# Patient Record
Sex: MALE | Race: WHITE | NOT HISPANIC OR LATINO | Employment: FULL TIME | URBAN - METROPOLITAN AREA
[De-identification: names, ages, dates, MRNs, and addresses within clinical notes are randomized per-mention and may not be internally consistent; named-entity substitution may affect disease eponyms.]

---

## 2018-08-01 ENCOUNTER — HISTORICAL (OUTPATIENT)
Dept: ADMINISTRATIVE | Facility: HOSPITAL | Age: 33
End: 2018-08-01

## 2018-08-01 LAB
ALBUMIN SERPL-MCNC: 4 GM/DL (ref 3.4–5)
ALBUMIN/GLOB SERPL: 1.2 {RATIO}
ALP SERPL-CCNC: 80 UNIT/L (ref 50–136)
ALT SERPL-CCNC: 42 UNIT/L (ref 12–78)
AST SERPL-CCNC: 25 UNIT/L (ref 15–37)
BILIRUB SERPL-MCNC: 0.7 MG/DL (ref 0.2–1)
BILIRUBIN DIRECT+TOT PNL SERPL-MCNC: 0.2 MG/DL (ref 0–0.2)
BILIRUBIN DIRECT+TOT PNL SERPL-MCNC: 0.5 MG/DL (ref 0–0.8)
BUN SERPL-MCNC: 14 MG/DL (ref 7–18)
CALCIUM SERPL-MCNC: 8.9 MG/DL (ref 8.5–10.1)
CHLORIDE SERPL-SCNC: 111 MMOL/L (ref 98–107)
CHOLEST SERPL-MCNC: 179 MG/DL (ref 0–200)
CHOLEST/HDLC SERPL: 3.6 {RATIO} (ref 0–5)
CO2 SERPL-SCNC: 25 MMOL/L (ref 21–32)
CREAT SERPL-MCNC: 0.98 MG/DL (ref 0.7–1.3)
ERYTHROCYTE [DISTWIDTH] IN BLOOD BY AUTOMATED COUNT: 12.1 % (ref 11.5–17)
GLOBULIN SER-MCNC: 3.4 GM/DL (ref 2.4–3.5)
GLUCOSE SERPL-MCNC: 94 MG/DL (ref 74–106)
HCT VFR BLD AUTO: 46.7 % (ref 42–52)
HDLC SERPL-MCNC: 50 MG/DL (ref 35–60)
HGB BLD-MCNC: 16 GM/DL (ref 14–18)
LDLC SERPL CALC-MCNC: 113 MG/DL (ref 0–129)
MCH RBC QN AUTO: 30.6 PG (ref 27–31)
MCHC RBC AUTO-ENTMCNC: 34.3 GM/DL (ref 33–36)
MCV RBC AUTO: 89.3 FL (ref 80–94)
PLATELET # BLD AUTO: 236 X10(3)/MCL (ref 130–400)
PMV BLD AUTO: 9.2 FL (ref 9.4–12.4)
POTASSIUM SERPL-SCNC: 4.2 MMOL/L (ref 3.5–5.1)
PROT SERPL-MCNC: 7.4 GM/DL (ref 6.4–8.2)
RBC # BLD AUTO: 5.23 X10(6)/MCL (ref 4.7–6.1)
SODIUM SERPL-SCNC: 143 MMOL/L (ref 136–145)
TESTOST SERPL-MCNC: 487 NG/DL (ref 241–827)
TRIGL SERPL-MCNC: 78 MG/DL (ref 30–150)
TSH SERPL-ACNC: 1.01 MIU/L (ref 0.36–3.74)
VLDLC SERPL CALC-MCNC: 16 MG/DL
WBC # SPEC AUTO: 5.6 X10(3)/MCL (ref 4.5–11.5)

## 2019-09-25 ENCOUNTER — HISTORICAL (OUTPATIENT)
Dept: ADMINISTRATIVE | Facility: HOSPITAL | Age: 34
End: 2019-09-25

## 2019-09-25 LAB
ALBUMIN SERPL-MCNC: 4 GM/DL (ref 3.4–5)
ALBUMIN/GLOB SERPL: 1.3 RATIO (ref 1.1–2)
ALP SERPL-CCNC: 88 UNIT/L (ref 50–136)
ALT SERPL-CCNC: 39 UNIT/L (ref 12–78)
AST SERPL-CCNC: 22 UNIT/L (ref 15–37)
BILIRUB SERPL-MCNC: 0.8 MG/DL (ref 0.2–1)
BILIRUBIN DIRECT+TOT PNL SERPL-MCNC: 0.2 MG/DL (ref 0–0.5)
BILIRUBIN DIRECT+TOT PNL SERPL-MCNC: 0.6 MG/DL (ref 0–0.8)
BUN SERPL-MCNC: 15 MG/DL (ref 7–18)
CALCIUM SERPL-MCNC: 8.9 MG/DL (ref 8.5–10.1)
CHLORIDE SERPL-SCNC: 108 MMOL/L (ref 98–107)
CO2 SERPL-SCNC: 26 MMOL/L (ref 21–32)
CREAT SERPL-MCNC: 0.94 MG/DL (ref 0.7–1.3)
ERYTHROCYTE [DISTWIDTH] IN BLOOD BY AUTOMATED COUNT: 11.8 % (ref 11.5–17)
GLOBULIN SER-MCNC: 3.1 GM/DL (ref 2.4–3.5)
GLUCOSE SERPL-MCNC: 91 MG/DL (ref 74–106)
HCT VFR BLD AUTO: 45.7 % (ref 42–52)
HGB BLD-MCNC: 16 GM/DL (ref 14–18)
MCH RBC QN AUTO: 30.8 PG (ref 27–31)
MCHC RBC AUTO-ENTMCNC: 35 GM/DL (ref 33–36)
MCV RBC AUTO: 87.9 FL (ref 80–94)
PLATELET # BLD AUTO: 271 X10(3)/MCL (ref 130–400)
PMV BLD AUTO: 9.2 FL (ref 9.4–12.4)
POTASSIUM SERPL-SCNC: 4.1 MMOL/L (ref 3.5–5.1)
PROT SERPL-MCNC: 7.1 GM/DL (ref 6.4–8.2)
RBC # BLD AUTO: 5.2 X10(6)/MCL (ref 4.7–6.1)
SODIUM SERPL-SCNC: 141 MMOL/L (ref 136–145)
TESTOST SERPL-MCNC: 267 NG/DL (ref 241–827)
WBC # SPEC AUTO: 5.5 X10(3)/MCL (ref 4.5–11.5)

## 2020-03-05 ENCOUNTER — HOSPITAL ENCOUNTER (OUTPATIENT)
Dept: NUTRITION | Facility: HOSPITAL | Age: 35
End: 2020-03-06
Attending: SURGERY | Admitting: SURGERY

## 2020-03-05 LAB
ABS NEUT (OLG): 8.55 X10(3)/MCL (ref 2.1–9.2)
ALBUMIN SERPL-MCNC: 4.4 GM/DL (ref 3.4–5)
ALBUMIN/GLOB SERPL: 1.3 RATIO (ref 1.1–2)
ALP SERPL-CCNC: 83 UNIT/L (ref 50–136)
ALT SERPL-CCNC: 41 UNIT/L (ref 12–78)
APPEARANCE, UA: CLEAR
APTT PPP: 30.3 SECOND(S) (ref 23.2–33.7)
AST SERPL-CCNC: 29 UNIT/L (ref 15–37)
BACTERIA SPEC CULT: ABNORMAL /HPF
BASOPHILS # BLD AUTO: 0 X10(3)/MCL (ref 0–0.2)
BASOPHILS NFR BLD AUTO: 0 %
BILIRUB SERPL-MCNC: 0.9 MG/DL (ref 0.2–1)
BILIRUB UR QL STRIP: NEGATIVE
BILIRUBIN DIRECT+TOT PNL SERPL-MCNC: 0.2 MG/DL (ref 0–0.5)
BILIRUBIN DIRECT+TOT PNL SERPL-MCNC: 0.7 MG/DL (ref 0–0.8)
BUN SERPL-MCNC: 12 MG/DL (ref 7–18)
CALCIUM SERPL-MCNC: 9.8 MG/DL (ref 8.5–10.1)
CHLORIDE SERPL-SCNC: 106 MMOL/L (ref 98–107)
CO2 SERPL-SCNC: 23 MMOL/L (ref 21–32)
COLOR UR: YELLOW
CREAT SERPL-MCNC: 0.99 MG/DL (ref 0.7–1.3)
ERYTHROCYTE [DISTWIDTH] IN BLOOD BY AUTOMATED COUNT: 12.2 % (ref 11.5–17)
GLOBULIN SER-MCNC: 3.5 GM/DL (ref 2.4–3.5)
GLUCOSE (UA): NEGATIVE
GLUCOSE SERPL-MCNC: 148 MG/DL (ref 74–106)
HCT VFR BLD AUTO: 47.8 % (ref 42–52)
HGB BLD-MCNC: 16.4 GM/DL (ref 14–18)
HGB UR QL STRIP: NEGATIVE
INR PPP: 1 (ref 0–1.3)
KETONES UR QL STRIP: ABNORMAL
LEUKOCYTE ESTERASE UR QL STRIP: ABNORMAL
LIPASE SERPL-CCNC: 84 UNIT/L (ref 73–393)
LYMPHOCYTES # BLD AUTO: 0.5 X10(3)/MCL (ref 0.6–4.6)
LYMPHOCYTES NFR BLD AUTO: 5 %
MCH RBC QN AUTO: 30.4 PG (ref 27–31)
MCHC RBC AUTO-ENTMCNC: 34.3 GM/DL (ref 33–36)
MCV RBC AUTO: 88.7 FL (ref 80–94)
MONOCYTES # BLD AUTO: 0 X10(3)/MCL (ref 0.1–1.3)
MONOCYTES NFR BLD AUTO: 0 %
NEUTROPHILS # BLD AUTO: 8.55 X10(3)/MCL (ref 2.1–9.2)
NEUTROPHILS NFR BLD AUTO: 94 %
NITRITE UR QL STRIP: NEGATIVE
PH UR STRIP: 8 [PH] (ref 5–9)
PLATELET # BLD AUTO: 299 X10(3)/MCL (ref 130–400)
PMV BLD AUTO: 9.5 FL (ref 9.4–12.4)
POTASSIUM SERPL-SCNC: 4.3 MMOL/L (ref 3.5–5.1)
PROT SERPL-MCNC: 7.9 GM/DL (ref 6.4–8.2)
PROT UR QL STRIP: ABNORMAL
PROTHROMBIN TIME: 13.3 SECOND(S) (ref 11.1–13.7)
RBC # BLD AUTO: 5.39 X10(6)/MCL (ref 4.7–6.1)
RBC #/AREA URNS HPF: ABNORMAL /[HPF]
SODIUM SERPL-SCNC: 136 MMOL/L (ref 136–145)
SP GR UR STRIP: 1.03 (ref 1–1.03)
SQUAMOUS EPITHELIAL, UA: ABNORMAL
TROPONIN I SERPL-MCNC: <0.02 NG/ML (ref 0.02–0.49)
UROBILINOGEN UR STRIP-ACNC: 1
WBC # SPEC AUTO: 9.1 X10(3)/MCL (ref 4.5–11.5)
WBC #/AREA URNS HPF: ABNORMAL /HPF

## 2020-09-10 ENCOUNTER — HISTORICAL (OUTPATIENT)
Dept: ADMINISTRATIVE | Facility: HOSPITAL | Age: 35
End: 2020-09-10

## 2020-10-02 ENCOUNTER — HISTORICAL (OUTPATIENT)
Dept: RADIOLOGY | Facility: HOSPITAL | Age: 35
End: 2020-10-02

## 2020-12-02 LAB
ABS NEUT (OLG): 5.41 X10(3)/MCL (ref 2.1–9.2)
ALBUMIN SERPL-MCNC: 4.5 GM/DL (ref 3.5–5)
ALBUMIN/GLOB SERPL: 1.2 RATIO (ref 1.1–2)
ALP SERPL-CCNC: 97 UNIT/L (ref 40–150)
ALT SERPL-CCNC: 29 UNIT/L (ref 0–55)
AST SERPL-CCNC: 25 UNIT/L (ref 5–34)
BASOPHILS # BLD AUTO: 0.1 X10(3)/MCL (ref 0–0.2)
BASOPHILS NFR BLD AUTO: 1 %
BILIRUB SERPL-MCNC: 0.6 MG/DL
BILIRUBIN DIRECT+TOT PNL SERPL-MCNC: 0.2 MG/DL (ref 0–0.5)
BILIRUBIN DIRECT+TOT PNL SERPL-MCNC: 0.4 MG/DL (ref 0–0.8)
BUN SERPL-MCNC: 13.5 MG/DL (ref 8.9–20.6)
CALCIUM SERPL-MCNC: 9.6 MG/DL (ref 8.4–10.2)
CHLORIDE SERPL-SCNC: 103 MMOL/L (ref 98–107)
CO2 SERPL-SCNC: 29 MMOL/L (ref 22–29)
CREAT SERPL-MCNC: 1.05 MG/DL (ref 0.73–1.18)
EOSINOPHIL # BLD AUTO: 0.2 X10(3)/MCL (ref 0–0.9)
EOSINOPHIL NFR BLD AUTO: 3 %
ERYTHROCYTE [DISTWIDTH] IN BLOOD BY AUTOMATED COUNT: 12.9 % (ref 11.5–17)
GLOBULIN SER-MCNC: 3.6 GM/DL (ref 2.4–3.5)
GLUCOSE SERPL-MCNC: 103 MG/DL (ref 74–100)
HCT VFR BLD AUTO: 48.6 % (ref 42–52)
HGB BLD-MCNC: 16.3 GM/DL (ref 14–18)
LYMPHOCYTES # BLD AUTO: 2.3 X10(3)/MCL (ref 0.6–4.6)
LYMPHOCYTES NFR BLD AUTO: 27 %
MCH RBC QN AUTO: 31 PG (ref 27–31)
MCHC RBC AUTO-ENTMCNC: 33.5 GM/DL (ref 33–36)
MCV RBC AUTO: 92.4 FL (ref 80–94)
MONOCYTES # BLD AUTO: 0.5 X10(3)/MCL (ref 0.1–1.3)
MONOCYTES NFR BLD AUTO: 6 %
NEUTROPHILS # BLD AUTO: 5.41 X10(3)/MCL (ref 2.1–9.2)
NEUTROPHILS NFR BLD AUTO: 64 %
PLATELET # BLD AUTO: 347 X10(3)/MCL (ref 130–400)
PMV BLD AUTO: 9.5 FL (ref 9.4–12.4)
POTASSIUM SERPL-SCNC: 4.4 MMOL/L (ref 3.5–5.1)
PROT SERPL-MCNC: 8.1 GM/DL (ref 6.4–8.3)
RBC # BLD AUTO: 5.26 X10(6)/MCL (ref 4.7–6.1)
SARS-COV-2 RNA RESP QL NAA+PROBE: NOT DETECTED
SODIUM SERPL-SCNC: 143 MMOL/L (ref 136–145)
WBC # SPEC AUTO: 8.5 X10(3)/MCL (ref 4.5–11.5)

## 2020-12-03 ENCOUNTER — HISTORICAL (OUTPATIENT)
Dept: SURGERY | Facility: HOSPITAL | Age: 35
End: 2020-12-03

## 2021-07-19 ENCOUNTER — HISTORICAL (OUTPATIENT)
Dept: ADMINISTRATIVE | Facility: HOSPITAL | Age: 36
End: 2021-07-19

## 2021-07-19 LAB
ABS NEUT (OLG): 5.61 X10(3)/MCL (ref 2.1–9.2)
ALBUMIN SERPL-MCNC: 4.3 GM/DL (ref 3.5–5)
ALBUMIN/GLOB SERPL: 1.4 RATIO (ref 1.1–2)
ALP SERPL-CCNC: 92 UNIT/L (ref 40–150)
ALT SERPL-CCNC: 34 UNIT/L (ref 0–55)
AST SERPL-CCNC: 27 UNIT/L (ref 5–34)
BASOPHILS # BLD AUTO: 0 X10(3)/MCL (ref 0–0.2)
BASOPHILS NFR BLD AUTO: 1 %
BILIRUB SERPL-MCNC: 0.7 MG/DL
BILIRUBIN DIRECT+TOT PNL SERPL-MCNC: 0.3 MG/DL (ref 0–0.5)
BILIRUBIN DIRECT+TOT PNL SERPL-MCNC: 0.4 MG/DL (ref 0–0.8)
BUN SERPL-MCNC: 10.3 MG/DL (ref 8.9–20.6)
CALCIUM SERPL-MCNC: 9.4 MG/DL (ref 8.4–10.2)
CHLORIDE SERPL-SCNC: 105 MMOL/L (ref 98–107)
CO2 SERPL-SCNC: 25 MMOL/L (ref 22–29)
CREAT SERPL-MCNC: 0.95 MG/DL (ref 0.73–1.18)
EOSINOPHIL # BLD AUTO: 0.1 X10(3)/MCL (ref 0–0.9)
EOSINOPHIL NFR BLD AUTO: 1 %
ERYTHROCYTE [DISTWIDTH] IN BLOOD BY AUTOMATED COUNT: 12.6 % (ref 11.5–17)
GLOBULIN SER-MCNC: 3.1 GM/DL (ref 2.4–3.5)
GLUCOSE SERPL-MCNC: 75 MG/DL (ref 74–100)
HCT VFR BLD AUTO: 47.7 % (ref 42–52)
HGB BLD-MCNC: 16.4 GM/DL (ref 14–18)
LYMPHOCYTES # BLD AUTO: 1.5 X10(3)/MCL (ref 0.6–4.6)
LYMPHOCYTES NFR BLD AUTO: 20 %
MCH RBC QN AUTO: 31.6 PG (ref 27–31)
MCHC RBC AUTO-ENTMCNC: 34.4 GM/DL (ref 33–36)
MCV RBC AUTO: 91.9 FL (ref 80–94)
MONOCYTES # BLD AUTO: 0.6 X10(3)/MCL (ref 0.1–1.3)
MONOCYTES NFR BLD AUTO: 7 %
NEUTROPHILS # BLD AUTO: 5.61 X10(3)/MCL (ref 2.1–9.2)
NEUTROPHILS NFR BLD AUTO: 71 %
PLATELET # BLD AUTO: 290 X10(3)/MCL (ref 130–400)
PMV BLD AUTO: 9.5 FL (ref 9.4–12.4)
POTASSIUM SERPL-SCNC: 4 MMOL/L (ref 3.5–5.1)
PROT SERPL-MCNC: 7.4 GM/DL (ref 6.4–8.3)
RBC # BLD AUTO: 5.19 X10(6)/MCL (ref 4.7–6.1)
SODIUM SERPL-SCNC: 141 MMOL/L (ref 136–145)
TESTOST SERPL-MCNC: 454.29 NG/DL (ref 240.24–870.68)
WBC # SPEC AUTO: 7.9 X10(3)/MCL (ref 4.5–11.5)

## 2022-04-10 ENCOUNTER — HISTORICAL (OUTPATIENT)
Dept: ADMINISTRATIVE | Facility: HOSPITAL | Age: 37
End: 2022-04-10

## 2022-04-28 VITALS
SYSTOLIC BLOOD PRESSURE: 126 MMHG | OXYGEN SATURATION: 98 % | WEIGHT: 180.75 LBS | BODY MASS INDEX: 25.31 KG/M2 | HEIGHT: 71 IN | DIASTOLIC BLOOD PRESSURE: 72 MMHG

## 2022-04-29 NOTE — H&P
Patient:   Eliot Montiel            MRN: [id number]            FIN: [id number]               Age:   34 years     Sex:  Male     :  1985   Associated Diagnoses:   None   Author:   Israel Stevenson DO      History of Present Illness   35 yo M presents with one day of abdominal pain, nausea, and vomiting.  He had 6 episodes of vomiting over one days duration.  He says that he also has abdominal pain that is worse in the right lower quadrant of his abdomen.  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  He denies any eye pain, headaches, or blurry vision when this happened.  He denies weakness or any loss of consciousness.  He last remembers eating a peanut butter and jelly sandwich last night, but did not feel feverish.  No recent sick contacts.   He denies any cardiac history.  He has never had any surgeries before and has never had a colonoscopy.  He denies recent fevers, chills, or any new onset weight loss.   The last time he drank something was water this morning at 7 AM.     PMH: none  PSH: none  Medications: Testosterone shots for Low       Review of Systems   Constitutional:  No fever, No chills, +fatigue   Eye:  No recent visual problem.    Respiratory:  No shortness of breath.    Cardiovascular:  No chest pain    Gastrointestinal:  + nausea, + vomiting, +abdominal pain diffusely tender  Musculoskeletal: No joint pain.  Integumentary:  No rash    Neurologic: No focal deficits.  Psychiatric: No depression or anxiety.       Histories   Past Medical History:    No active or resolved past medical history items have been selected or recorded.   Family History:    Entire family history is negative.   Procedure history:    none.   Social History        Social & Psychosocial Habits    Alcohol  2018  Use: Current    Frequency: 1-2 times per week    Has alcohol  use interfered with work or home life? No    Do you ever drink more than intended? No    Has anyone been hurt or at risk by your drinking? No    Ready to change: No    Concerns about alcohol use in household: No    Employment/School  07/26/2018  Status: Employed    Exercise  07/26/2018  Times per week: 3-4 times/week    Exercise type: Running, Walking, Weight lifting    Home/Environment  07/26/2018  Lives with: Spouse    Nutrition/Health  07/26/2018  Type of diet: Regular    Substance Use  07/26/2018  Use: Never    Tobacco  07/26/2018  Use: Former smoker    Type: Cigarettes    Comment: quit 12/2009 - 07/26/2018 15:46 - Cezar AMARAL, April G.    01/09/2019  Use: Former smoker, quit more    Patient Wants Consult For Cessation Counseling N/A    09/11/2019  Use: Smoker, current status un    Patient Wants Consult For Cessation Counseling N/A    Abuse/Neglect  09/11/2019  SHX Any signs of abuse or neglect No    03/05/2020  SHX Any signs of abuse or neglect No    Feels unsafe at home: No  .        Physical Examination      Vital Signs (last 24 hrs)_____  Last Charted___________  Temp Oral     36.5 DegC  (MAR 05 07:55)  Heart Rate Peripheral  74 bpm  (MAR 05 07:55)  Resp Rate         18 br/min  (MAR 05 09:09)  SBP      129 mmHg  (MAR 05 07:55)  DBP      79 mmHg  (MAR 05 07:55)  SpO2      100 %  (MAR 05 07:55)     General: alert and oriented  Eye:  no scleral icterus  HENT: normocephalic  Neck: supple  Respiratory:  nonlabored respirations  Cardiovascular: regular rate and rhythm   Gastrointestinal: +guarding, + severe pain to palpation over abdomen, when touching right lower quadrant the pain radiates to the left.  Musculoskeletal: no muscle atrophy noted, no LE edema  Integumentary: no acute rashes or skin lesions present  Neuro: no focal deficits, no aphasia or slurred speech    Labs Last 24 Hours   Chemistry  Hematology/Coagulation    Sodium Lvl: 136 mmol/L (03/05/20 08:21:00) PT: 13.3 second(s) (03/05/20 08:21:00)    Potassium Lvl: 4.3 mmol/L (03/05/20 08:21:00) INR: 1 (03/05/20 08:21:00)   Chloride: 106 mmol/L (03/05/20 08:21:00) PTT: 30.3 second(s) (03/05/20 08:21:00)   CO2: 23 mmol/L (03/05/20 08:21:00) WBC: 9.1 x10(3)/mcL (03/05/20 08:21:00)   Calcium Lvl: 9.8 mg/dL (03/05/20 08:21:00) RBC: 5.39 x10(6)/mcL (03/05/20 08:21:00)   Glucose Lvl: 148 mg/dL High (03/05/20 08:21:00) Hgb: 16.4 gm/dL (03/05/20 08:21:00)   BUN: 12 mg/dL (03/05/20 08:21:00) Hct: 47.8 % (03/05/20 08:21:00)   Creatinine: 0.99 mg/dL (03/05/20 08:21:00) Platelet: 299 x10(3)/mcL (03/05/20 08:21:00)   eGFR-AA: >60 (03/05/20 08:21:00) MCV: 88.7 fL (03/05/20 08:21:00)   eGFR-RICH: >60 (03/05/20 08:21:00) MCH: 30.4 pg (03/05/20 08:21:00)   Bili Total: 0.9 mg/dL (03/05/20 08:21:00) MCHC: 34.3 gm/dL (03/05/20 08:21:00)   Bili Direct: 0.2 mg/dL (03/05/20 08:21:00) RDW: 12.2 % (03/05/20 08:21:00)   Bili Indirect: 0.7 mg/dL (03/05/20 08:21:00) MPV: 9.5 fL (03/05/20 08:21:00)   AST: 29 unit/L (03/05/20 08:21:00) Abs Neut: 8.55 x10(3)/mcL (03/05/20 08:21:00)   ALT: 41 unit/L (03/05/20 08:21:00) Neutro Auto: 94 % (03/05/20 08:21:00)   Alk Phos: 83 unit/L (03/05/20 08:21:00) Lymph Auto: 5 % (03/05/20 08:21:00)   Total Protein: 7.9 gm/dL (03/05/20 08:21:00) Mono Auto: 0 % (03/05/20 08:21:00)   Albumin Lvl: 4.4 gm/dL (03/05/20 08:21:00) Basophil Auto: 0 % (03/05/20 08:21:00)   Globulin: 3.5 gm/dL (03/05/20 08:21:00) Abs Neutro: 8.55 x10(3)/mcL (03/05/20 08:21:00)   A/G Ratio: 1.3 ratio (03/05/20 08:21:00) Abs Lymph: 0.5 x10(3)/mcL Low (03/05/20 08:21:00)   Lipase Lvl: 84 unit/L (03/05/20 08:21:00) Abs Mono: 0 x10(3)/mcL Low (03/05/20 08:21:00)    Abs Baso: 0 x10(3)/mcL (03/05/20 08:21:00)     Accession: [id number]  Order: CT Abdomen and Pelvis W Contrast  Report Dt/Tm: 03/05/2020 11:02  Report:   CT Abdomen and Pelvis W Contrast     REASON FOR STUDY: Abdominal Pain      TECHNIQUE: CT imaging was performed of the abdomen and pelvis after  the administration of  intravenous contrast. Dose length product is 449  mGycm. Automatic exposure control, adjustment of mA/kV or iterative  reconstruction technique was used to limit radiation dose.     COMPARISON: None      FINDINGS:      Liver: Normal.      Gallbladder and biliary tree: No calcified gallstones. No intra or  extrahepatic biliary ductal dilation.      Pancreas: Normal.     Spleen: Normal.     Adrenals: Normal.     Kidneys and ureters: A simple appearing right renal cyst measures 1.2  cm. There is no hydronephrosis.     Bladder: Normal.     Reproductive organs: No pelvic masses.     Stomach/bowel: The appendix is dilated measuring up to 1.5 cm in  thickness. There is surrounding inflammatory change and free fluid.  There is no free air or organized fluid collection. There are small  densities along the wall of the gastric fundus with a larger density  layering in the proximal stomach which may represent calcifications.  There is no bowel obstruction.     Lymph nodes: No pathologically enlarged lymph node identified.     Peritoneum: No ascites or free air. No fluid collection.     Vessels: No abdominal aortic aneurysm.      Abdominal wall: Normal.     Lung bases: No consolidation or pleural effusion.     Bones: No acute osseous findings.      IMPRESSION:   Findings consistent with acute appendicitis. No free air or abscess  formation.    Accession: [id number]  Order: CT Head W/O Contrast  Report Dt/Tm: 03/05/2020 10:57  Report:      CLINICAL: Dizziness and vertigo     TECHNIQUE: Sequential axial images were performed of the brain without  contrast.      Dose product length of 1032 mGycm. Automated exposure control was  utilized to minimize radiation dose.     COMPARISON: None available.      FINDINGS: Gray-white matter differentiation is unremarkable. There is  no intracranial mass effect, midline shift, hydrocephalus or  hemorrhage. There is no sulcal effacement or low attenuation changes  to suggest recent large  vessel territory infarction.  There is no  acute extra axial fluid collection.      Right maxillary sinus is remarkable for large retention cyst. There is  also marked mucosal thickening of the left maxillary sinus with some  network of septations. Mucosal thickening also involve the sphenoid  sinus. Mastoid air cells aeration is optimal.      IMPRESSION:     1. No acute intracranial findings identified.  2. Sinusitis.           Impression and Plan     33 yo M with acute onset of abdominal pain, nausea, and vomiting with CT revealing findings consistent with acute appendicitis    -Plan for OR today for laparoscopic appendectomy  -NPO

## 2022-04-29 NOTE — ED PROVIDER NOTES
"   Patient:   Eliot Montiel            MRN: [id number]            FIN: [id number]               Age:   34 years     Sex:  Male     :  1985   Associated Diagnoses:   Acute appendicitis   Author:   Conrado Charles MD      Basic Information   Time seen: Date 3/5/2020, Immediately upon arrival.   History source: Patient.   Arrival mode: Private vehicle.   History limitation: None.   Additional information: Chief Complaint from Nursing Triage Note : Chief Complaint   3/5/2020 7:55 CST        Chief Complaint           pt reports he was on his way to work and became dizzy and disoriented. pt reports numbness and tingling to extremeties. pt reports vomiting x 5 after the dizziness episode. pt denies CP at this time.  (Modified) .      History of Present Illness   This patient presents with multiple complaints.  Essentially the patient woke this morning at 2 AM to go to work.  He is a  for an [employer].  Patient states approximately 4:30 AM, he had sudden onset of "dizziness and disorientation ".  Patient states he was confused about where he might be and states that he felt like he was spinning.  The patient subsequently had nausea and vomiting.  And has also developed significant abdominal pain of the course the morning, unrelated to his vomiting.  The patient then did develop numbness and tingling to both upper and lower extremities.  Denies any recent illness, medication changes or trauma.  Patient denies any sick contacts.  Denies any headache, vision changes.  Patient denies any trouble with speech or swallowing.  The wife states that while he was initially "disoriented" that is improved.  The symptoms were sudden, moderate, with improvement of disorientation, no change in the dizziness, and worsening abdominal pain.  There've been no exacerbating or relieving factors.      Review of Systems   Constitutional symptoms:  Negative except as documented in HPI.   Skin symptoms:  Negative " except as documented in HPI.   Eye symptoms:  Negative except as documented in HPI.   ENMT symptoms:  Negative except as documented in HPI.   Respiratory symptoms:  Negative except as documented in HPI.   Cardiovascular symptoms:  Negative except as documented in HPI.   Gastrointestinal symptoms:  Negative except as documented in HPI.   Musculoskeletal symptoms:  Negative except as documented in HPI.   Neurologic symptoms:  Negative except as documented in HPI.   Hematologic/Lymphatic symptoms:  Negative except as documented in HPI.             Additional review of systems information: All other systems reviewed and otherwise negative.      Health Status   Allergies:    Allergic Reactions (Selected)  No Known Medication Allergies.      Past Medical/ Family/ Social History   Medical history: Negative.   Surgical history: Negative.   Family history:    Entire family history is negative..      Physical Examination               Vital Signs   Vital Signs   3/5/2020 7:55 CST        Temperature Oral          36.5 DegC                             Temperature Oral (calculated)             97.70 DegF                             Peripheral Pulse Rate     74 bpm                             Respiratory Rate          24 br/min                             SpO2                      100 %                             Oxygen Therapy            Room air                             Systolic Blood Pressure   129 mmHg                             Diastolic Blood Pressure  79 mmHg  .   Measurements   3/5/2020 7:55 CST        Weight Dosing             79.5 kg                             Weight Measured and Calculated in Lbs     175.27 lb                             Weight Estimated          79.5 kg                             Height/Length Dosing      172 cm                             Height/Length Estimated   172 cm                             Body Mass Index Estimated 26.87 kg/m2  .   Oxygen saturation.   General:  Alert, mild distress,  anxious.    Skin:  Warm, dry.    Head:  Normocephalic, atraumatic.    Neck:  Supple, trachea midline, no JVD, no carotid bruit.    Eye:  Pupils are equal, round and reactive to light, extraocular movements are intact, normal conjunctiva, vision unchanged.    Ears, nose, mouth and throat:  Tympanic membranes clear, oral mucosa moist, no pharyngeal erythema or exudate.    Cardiovascular:  Regular rate and rhythm, No murmur, Normal peripheral perfusion.    Respiratory:  Lungs are clear to auscultation, breath sounds are equal.    Gastrointestinal:  Soft, Non distended, Normal bowel sounds, Diffusely tender to even light touch without rebound or guarding.    Back:  Nontender, Normal range of motion.    Musculoskeletal:  Normal ROM, normal strength, no tenderness, no swelling.    Neurological:  Alert and oriented to person, place, time, and situation, No focal neurological deficit observed, CN II-XII intact, normal sensory observed, normal motor observed, normal coordination observed, Speech: Normal.    Lymphatics:  No lymphadenopathy.   Psychiatric:  Cooperative, appropriate mood & affect.       Medical Decision Making   Documents reviewed:  Emergency department nurses' notes.   Electrocardiogram:  Time 3/5/2020 07:56:00, rate 70, normal sinus rhythm, No ST-T changes, no ectopy, normal TN & QRS intervals, EP Interp.    Results review:  Lab results : Lab View   3/5/2020 9:30 CST        UA Appear                 CLEAR                             UA Color                  YELLOW                             UA Spec Grav              1.029                             UA Bili                   Negative                             UA pH                     8.0                             UA Urobilinogen           1.0                             UA Blood                  Negative                             UA Glucose                Negative                             UA Ketones                2+                             UA  Protein                1+                             UA Nitrite                Negative                             UA Leuk Est               Trace                             UA WBC                    4-6 /HPF                             UA RBC                    NONE SEEN                             UA Bacteria               NONE SEEN /HPF                             UA Squam Epithelial       None Seen    3/5/2020 8:21 CST        Sodium Lvl                136 mmol/L                             Potassium Lvl             4.3 mmol/L                             Chloride                  106 mmol/L                             CO2                       23.0 mmol/L                             Calcium Lvl               9.8 mg/dL                             Glucose Lvl               148 mg/dL  HI                             BUN                       12.0 mg/dL                             Creatinine                0.99 mg/dL                             eGFR-AA                   >60 mL/min/1.73 m2  NA                             eGFR-RICH                  >60 mL/min/1.73 m2  NA                             Bili Total                0.9 mg/dL                             Bili Direct               0.20 mg/dL                             Bili Indirect             0.70 mg/dL                             AST                       29 unit/L                             ALT                       41 unit/L                             Alk Phos                  83 unit/L                             Total Protein             7.9 gm/dL                             Albumin Lvl               4.40 gm/dL                             Globulin                  3.50 gm/dL                             A/G Ratio                 1.3 ratio                             Lipase Lvl                84 unit/L                             PT                        13.3 second(s)                             INR                       1.0                              PTT                       30.3 second(s)                             WBC                       9.1 x10(3)/mcL                             RBC                       5.39 x10(6)/mcL                             Hgb                       16.4 gm/dL                             Hct                       47.8 %                             Platelet                  299 x10(3)/mcL                             MCV                       88.7 fL                             MCH                       30.4 pg                             MCHC                      34.3 gm/dL                             RDW                       12.2 %                             MPV                       9.5 fL                             Abs Neut                  8.55 x10(3)/mcL                             Neutro Auto               94 %  NA                             Lymph Auto                5 %  NA                             Mono Auto                 0 %  NA                             Basophil Auto             0 %  NA                             Abs Neutro                8.55 x10(3)/mcL                             Abs Lymph                 0.5 x10(3)/mcL  LOW                             Abs Mono                  0.0 x10(3)/mcL  LOW                             Abs Baso                  0.0 x10(3)/mcL  .   Radiology results:  Rad Results (ST)  < 12 hrs   Accession: [id number]  Order: CT Abdomen and Pelvis W Contrast  Report Dt/Tm: 03/05/2020 11:02  Report:   CT Abdomen and Pelvis W Contrast     REASON FOR STUDY: Abdominal Pain      TECHNIQUE: CT imaging was performed of the abdomen and pelvis after  the administration of intravenous contrast. Dose length product is 449  mGycm. Automatic exposure control, adjustment of mA/kV or iterative  reconstruction technique was used to limit radiation dose.     COMPARISON: None      FINDINGS:      Liver: Normal.      Gallbladder and biliary tree: No calcified gallstones. No intra or  extrahepatic biliary  ductal dilation.      Pancreas: Normal.     Spleen: Normal.     Adrenals: Normal.     Kidneys and ureters: A simple appearing right renal cyst measures 1.2  cm. There is no hydronephrosis.     Bladder: Normal.     Reproductive organs: No pelvic masses.     Stomach/bowel: The appendix is dilated measuring up to 1.5 cm in  thickness. There is surrounding inflammatory change and free fluid.  There is no free air or organized fluid collection. There are small  densities along the wall of the gastric fundus with a larger density  layering in the proximal stomach which may represent calcifications.  There is no bowel obstruction.     Lymph nodes: No pathologically enlarged lymph node identified.     Peritoneum: No ascites or free air. No fluid collection.     Vessels: No abdominal aortic aneurysm.      Abdominal wall: Normal.     Lung bases: No consolidation or pleural effusion.     Bones: No acute osseous findings.      IMPRESSION:   Findings consistent with acute appendicitis. No free air or abscess  formation.    Accession: [id number]  Order: CT Head W/O Contrast  Report Dt/Tm: 03/05/2020 10:57  Report:      CLINICAL: Dizziness and vertigo     TECHNIQUE: Sequential axial images were performed of the brain without  contrast.      Dose product length of 1032 mGycm. Automated exposure control was  utilized to minimize radiation dose.     COMPARISON: None available.      FINDINGS: Gray-white matter differentiation is unremarkable. There is  no intracranial mass effect, midline shift, hydrocephalus or  hemorrhage. There is no sulcal effacement or low attenuation changes  to suggest recent large vessel territory infarction.  There is no  acute extra axial fluid collection.      Right maxillary sinus is remarkable for large retention cyst. There is  also marked mucosal thickening of the left maxillary sinus with some  network of septations. Mucosal thickening also involve the sphenoid  sinus. Mastoid air cells aeration is  optimal.      IMPRESSION:     1. No acute intracranial findings identified.  2. Sinusitis.    .      Reexamination/ Reevaluation   Patient with an episode of dizziness, lightheadedness along with brief disorientation followed by severe abdominal pain or nausea and vomiting.  Will CT both head as well as abdomen.    While in the ED, the patient states his abdominal pain feels like is moving lower.  It still anterior.    Patient CT scan consistent with acute appendicitis.  IV antibodies ordered and surgical consultation      Impression and Plan   Diagnosis   Acute appendicitis ([id number] K35.80)      Calls-Consults   -  3/5/2020 11:50:00 , Surgical hospitalist, consult.    Plan   Condition: Stable.    Disposition: Admit time  3/5/2020 12:00:00, Admit to Inpatient Unit.    Counseled: Patient, Regarding diagnosis, Regarding diagnostic results, Regarding treatment plan, Patient indicated understanding of instructions.

## 2022-04-29 NOTE — OP NOTE
Eliot Calles MD      Patient:   Eliot Montiel            MRN: [id number]            FIN: [id number]               Age:   35 years     Sex:  Male     :  1985   Associated Diagnoses:   None   Author:   Eliot Calles MD      Surgeon: Dr. Eliot Calles MD    Assitant: SALVADOR Fonseca    Preoperative Diagnosis:  Symptomatic cholelithiasis    Postoperative diagnosis:  Same    Procedure: Laparoscopic cholecystectomy    Anesthesia:  GETA    EBL: 20cc    Intraoperative findings:  Chronic inflammation and distention of the gallbladder    Procedure in Detail:  After informed consent was obtained the patient was brought to the operating room placed in the supine position.  General endotracheal anesthesia was administered without difficulty.  The patient's abdomen was prepped and draped in sterile fashion.  After infiltration with local anesthesia, an umbilical incision was made and a 5 mm optical trocar was used to enter the peritoneal cavity under direct vision.  Pneumoperitoneum was achieved without difficulty.  Additional 5 mm ports were placed in the epigastrium in the right upper quadrant under direct laparoscopic vision.  The patient was placed in steep reverse Trendelenburg position and tilted towards the left.  The gallbladder was identified in the right upper quadrant it showed signs of chronic inflammation and distention.  The fundus was retracted cephalad.  The infundibulum was retracted laterally.  The cystic duct was identified and dissected circumferentially using gentle blunt dissection at its confluence with the gallbladder.  The cystic artery was similarly dissected circumferentially at its entrance into the gallbladder.  Once the anatomy was clearly defined, the cystic duct was divided between clips with 2 clips placed on the stay in side.  The cystic artery was divided between clips.  The gallbladder was excised from the gallbladder fossa using hook electrocautery.  Meticulous  hemostasis was achieved.  The gallbladder was decompressed and placed in an Endo Catch bag.  It was removed via the umbilical port site.  The right upper quadrant was irrigated, the irrigant was suctioned out.  The area was again inspected for hemostasis which was excellent, and bile leak and none was seen.  The umbilical port site fascia was closed with a 0 Vicryl stitch.  Ports were removed, pneumoperitoneum was relieved.  Port sites were closed with absorbable suture and sterile dressings were applied.  The patient was extubated in the operating room and brought to recovery room in stable condition.

## 2022-04-29 NOTE — OP NOTE
DATE OF SURGERY:    03/05/2020    SURGEON:  Bull Zhang MD    OPERATION:  Laparoscopic appendectomy.    INDICATIONS:  This is a 34-year-old male with severe lower abdominal pain that developed today.  He had 6 episodes of vomiting, subjective fever and chills, presented to the emergency department, and his entire abdomen was tender.  He was worked up in the ED with laboratory study showing no leukocytosis; but, he did have a CT scan had demonstrating what appeared to be acute appendicitis.       I discussed the risks and benefits of surgery with him.  He understood the risks and benefits of surgery, and agreed for laparoscopic, possible open appendectomy.    ESTIMATED BLOOD LOSS:  5 cc.    COMPLICATIONS:  None.    FINDINGS:  Perforated appendicitis, making this a class contaminated case, and also there was reactive ileitis.    SPECIMEN REMOVED:  Appendix.    ANESTHESIA:  General endotracheal.    DESCRIPTION OF PROCEDURE:  Patient was brought to the operating room and laid supine on the operative table.  General anesthesia was administered.  He was intubated endotracheally.  The abdomen was prepped and draped in the usual sterile fashion.       A 5 mm transumbilical Visiport was placed using a modified Lisa technique.  There was no injury to intraabdominal structures during the placement of this port.  Pneumoperitoneum was achieved.  Additional working ports were placed in the supine pubis and then a 12 mm port in the patient's left flank.       Attention returned was turned toward the patient's right lower quadrant.  However, the cecum was quite floppy and had migrated more toward a pelvic location.  The cecum was elevated.  The distal 30 cm of small bowel was quite reactive.  Therefore, the distal ileum was quite reactive and inflamed; however, there was no perforation here.       We then located the appendix at the base of the cecum.  There was a perforation, making this a class 4 contaminated case,  near the base of the appendix.  The Harmonic device was used to mobilize the white line of Toldt, giving more free mobility for the cecum, so that we could place a window in the mesoappendix. Through this, we did use to [place] retrograde window in the mesoappendix near the base of the cecum.       We advanced a blue Endo-ELLIS stapler cartridge through it and then fired it, taking a cuff of the cecum to avoid the area of perforation or to exclude the area of perforation with our stapler.  We then used a white Endo-ELLIS stapler cartridge with a single fire to remove the appendix from the mesoappendix.  We then retrieved the appendix using an EndoCatch bag.       We irrigated the abdomen and pelvis copiously, sucking out some purulent fluid.  We removed all our ports under direct vision.  We closed the 12 mm port site in the patient's left flank with a 0 Vicryl suture.  Skin incisions were closed with 4-0 subcuticular Vicryl sutures and Dermabond.       Patient was extubated and brought to the post-anesthesia care unit in stable and satisfactory condition.        ______________________________  Bull Zhang MD RA/TOAN  DD:  03/05/2020  Time:  03:14PM  DT:  03/05/2020  Time:  03:39PM  Job #:  [id number]

## 2022-05-04 NOTE — HISTORICAL [facility] CERNER
This is a historical note converted from [employer]. Formatting and pictures may have been removed.  Please reference Ceryun for original formatting and attached multimedia. Chief Complaint  Left lower back pain onset yesterday with radiation to left side around to abd area. Initial injury onset 2015  History of Present Illness  34-year-old male presents for concern of back pain L lower side x 1 day with radiation to the B lower abdomen but more L lower abdomen and occasional R lower abdomen, abd sensation comes and goes, sharp sensation.? No acute injury other than leaning over, no X rays or other imaging done on the back, had pain in the past from injury 5 years prior.? No bowel or bladder incontinence. Trying stretching.  Review of Systems  Constitutional_negative for fever  ENMT_negative for rhinorrhea, sinus pressure, sore throat,?blurry vision or change in vision  Respiratory_negative for?cough, SOB  Gastrointestinal_negative for nausea or vomiting  Integumentary_negative for rash  Physical Exam  Vitals & Measurements  T:?36.1? ?C (Tympanic)? HR:?62(Peripheral)? RR:?16? BP:?131/75? SpO2:?98%?  HT:?179.94?cm? WT:?78.540?kg? BMI:?24.26?  Gen: WD NAD  CV: S1S2 RRR no MRG  Resp: CTA B  Extr: no CCE  Integument: warm, no rashes or lesions to the back  MS: no T or L spine TTP, no step offs, neg SLR, no SI joint TTP, mild paraspinous muscle TTP, + FADIR L side  Abd: NTND  Psych: Cooperative, approp mood and affect  Assessment/Plan  1.?Low back strain?S39.012A  ?X ray of the back done today, per my review no fractures or dislocations.? Spine appears to be spaced out evenly.?  Will also call with final report.  ?  Flexeril, stretch, Mobic, referral to PT [place]. Can also try warm compress.  Ordered:  cyclobenzaprine, 5 mg = 1 tab(s), Oral, BID, can cause sedation, X 5 day(s), # 10 tab(s), 0 Refill(s), Pharmacy: [facility] #[id number], 179.94, cm, Height/Length Dosing, 09/10/20 8:38:00 CDT, 78.54, kg, Weight Dosing,  09/10/20 8:38:00 CDT  meloxicam, 15 mg = 1 tab(s), Oral, Daily, # 30 tab(s), 0 Refill(s), Pharmacy: [facility] #[id number], 179.94, cm, Height/Length Dosing, 09/10/20 8:38:00 CDT, 78.54, kg, Weight Dosing, 09/10/20 8:38:00 CDT  XR Spine Lumbar 2 or 3 Views, Routine, 09/10/20 8:56:00 CDT, None, Ambulatory, Rad Type, Low back strain, Not Scheduled, 09/10/20 8:56:00 CDT  ?   Problem List/Past Medical History  Ongoing  No qualifying data  Historical  No qualifying data  Procedure/Surgical History  Appendectomy Laparoscopic (.) (03/05/2020)  Laparoscopy, surgical, appendectomy (03/05/2020)  Resection of Appendix, Percutaneous Endoscopic Approach (03/05/2020)   Medications  Flexeril 5 mg oral tablet, 5 mg= 1 tab(s), Oral, BID  Mobic 15 mg oral tablet, 15 mg= 1 tab(s), Oral, Daily  testosterone cypionate 200 mg/mL intramuscular solution, 200 mg= 1 mL, IM, q2wk, 1 refills  Zofran ODT 4 mg oral tablet, disintegrating, 4 mg= 1 tab(s), Oral, Once  Zofran ODT 4 mg oral tablet, disintegrating, 4 mg= 1 tab(s), Oral, q8hr, PRN  Allergies  No Known Medication Allergies  Social History  Abuse/Neglect  No, No, Yes, 09/10/2020  Alcohol  Current, Liquor, 1-2 times per week, 03/05/2020  Employment/School  Employed, 07/26/2018  Exercise  Exercise frequency: 3-4 times/week. Exercise type: Walking, Running, Weight lifting., 07/26/2018  Home/Environment  Lives with Spouse., 07/26/2018  Nutrition/Health  Regular, 07/26/2018  Substance Use  Never, 07/26/2018  Tobacco  Former smoker, quit more than 30 days ago, Cigarettes, N/A, 09/10/2020  Family History  Family history is negative  Immunizations  Vaccine Date Status   meningococcal conjugate vaccine 02/20/2006 Recorded   Health Maintenance  Health Maintenance  ???Pending?(in the next year)  ??? ??Due?  ??? ? ? ?Influenza Vaccine due??09/10/20??and every?  ??? ? ? ?Tetanus Vaccine due??09/10/20??and every 10??year(s)  ??? ??Due In Future?  ??? ? ? ?Obesity Screening not due  until??01/01/21??and every 1??year(s)  ??? ? ? ?Alcohol Misuse Screening not due until??01/02/21??and every 1??year(s)  ??? ? ? ?ADL Screening not due until??03/13/21??and every 1??year(s)  ??? ? ? ?Depression Screening not due until??07/15/21??and every 1??year(s)  ???Satisfied?(in the past 1 year)  ??? ??Satisfied?  ??? ? ? ?ADL Screening on??03/13/20.??Satisfied by Alina Berg  ??? ? ? ?Alcohol Misuse Screening on??03/13/20.??Satisfied by Alina Berg  ??? ? ? ?Blood Pressure Screening on??09/10/20.??Satisfied by Eduardo De La Rosa LPN  ??? ? ? ?Body Mass Index Check on??09/10/20.??Satisfied by Eduardo De La Rosa LPN  ??? ? ? ?Depression Screening on??07/15/20.??Satisfied by Richelle De Souza  ??? ? ? ?Influenza Vaccine on??03/05/20.??Satisfied by Kristie Alatorre RN  ??? ? ? ?Obesity Screening on??09/10/20.??Satisfied by Eduardo De La Rosa LPN  ?

## 2022-05-04 NOTE — HISTORICAL [facility] CERNER
This is a historical note converted from [employer]. Formatting and pictures may have been removed.  Please reference Ceryun for original formatting and attached multimedia. Admit and Discharge Dates  Admit Date: 03/05/2020  Discharge Date: 03/06/2020  Physicians  Attending Physician - Leatha QUINTERO, Bull Bernard  Admitting Physician - Leatha QUINTERO, Bull Bernard  Primary Care Physician - Tim QUINTERO, Brent HERNANDEZ  Discharge Diagnosis  Acute appendicitis?K35.80  Surgical Procedures  03/05/20 -?Laparoscopic appendectomy  Immunizations  No immunizations recorded for this visit.  Admission Information  Patient presented to [facility] ED w/ 1d h/o abd pain and severe N/V. WBC was 9.1, and CT was c/w acute appendicitis. The patient was taken to the OR for a lap appy. Intraop, the appendix was noted to be ruptured w/ some purulent spillage into the abdomen. because of this, the patient was admitted for observation ON.  Hospital Course  ON post-op, the patient had one episode of emesis. The next AM, his N/V had resolved. He remained AF, his pain was well controlled on PO meds, he was tolerating PO intake, and he was deemed stable for discharge. Because of the purulent spillage into his abdomen, he was discharged w/ a 3d course of Augmentin.  Significant Findings  CT Abd/Pelvis:  The appendix is dilated measuring up to 1.5 cm in thickness. There is surrounding inflammatory change and free fluid. There is no free air or organized fluid collection. Findings consistent with acute appendicitis.  Time Spent on discharge  10 min  Objective  Vitals & Measurements  T:?37.2? ?C (Oral)? TMIN:?36.3? ?C (Oral)? TMAX:?37.2? ?C (Oral)? HR:?82(Peripheral)? RR:?13? BP:?108/67? SpO2:?99%? WT:?79.5?kg? BMI:?24.54?  Physical Exam  Gen: NAD  Neuro/Psych: awake, alert, answering questions appropriately  CV: RRR  Resp: non-labored breathing, JAYDE  Abd: soft, ND, appropriately TTP in RLQ and LLQ  Ext: moves all 4 spontaneously and purposefully  Skin: warm,  well-perfused  Wounds: trochar incision sites x2 C/D/I w/ subq sutures and Steri strips, minimal bloody drainage from umbilical incision  Patient Discharge Condition  Good  Discharge Disposition  Home   Discharge Medication Reconciliation  Prescribed  acetaminophen-HYDROcodone (acetaminophen-hydrocodone 300 mg-5 mg oral tablet)?1 tab(s), Oral, q4hr, PRN for pain  amoxicillin-clavulanate (Augmentin 500 mg-125 mg oral tablet)?500 mg, Oral, BID  ondansetron (Zofran ODT 4 mg oral tablet, disintegrating)?4 mg, Oral, q8hr, PRN nausea/vomiting  Continue  testosterone (testosterone cypionate 200 mg/mL intramuscular solution)?200 mg, IM, q2wk  Education and Orders Provided  Laparoscopic Appendectomy, Adult, Care After  Appendicitis, Adult  Discharge - 03/06/20 11:07:00 CST, Home, Give all scheduled vaccinations prior to discharge.?  Discharge Activity - Activity as Tolerated?  Discharge Diet - Regular?  Follow up  Brent Dumont, within 1 to 2 weeks  ????  Office will call patient on appt date and time.  Surgical Hospitalist Clinic Appointment, on 03/16/2020  ????  Keep scheduled appointment

## 2022-05-04 NOTE — HISTORICAL [facility] CERNER
This is a historical note converted from [employer]. Formatting and pictures may have been removed.  Please reference Ceryun for original formatting and attached multimedia. Chief Complaint  F/u lap appy  History of Present Illness  Eliot Montiel is a 35 yo M who was recently admitted to [facility] for acute appendicitis. Now POD11 s/p lap appy. Appendix found to be perforated intraoperatively. No hospital complications, discharged on POD1. Completed 3d course of Augmentin. Overall doing well. Still reports some abd pain, but overall well controlled. Tolerating regular diet w/o N/V. Normal bowel and bladder function.  Review of Systems  Negative except as mentioned in HPI.  Physical Exam  Vitals & Measurements  T:?37.2? ?C (Oral)? HR:?82(Peripheral)? RR:?13? BP:?108/67? SpO2:?99%?  HT:?180?cm? WT:?79.5?kg? BMI:?24.54?  Gen: NAD  Neuro/Psych: awake, alert, answering questions appropriately  CV: RRR  Resp: non-labored breathing, JAYDE  Abd: soft, ND, NT  Ext: moves all 4 spontaneously and purposefully  Skin: warm, well-perfused  Wounds: trochar incisions x3 C/D/I w/ subq sutures and Steri strips, no drainage or erythema  Assessment/Plan  Eliot Montiel is a 35 yo M POD11 s/p lap appy for perfed appendicitis  ?  - Doing well, no issues  - Regular diet as tolerated  - Ok to return to work, but no heavy lifting x4-6 wks postop  - RTC PRN   Problem List/Past Medical History  Ongoing  No qualifying data  Historical  No qualifying data  Procedure/Surgical History  Appendectomy Laparoscopic (.) (03/05/2020)  Laparoscopy, surgical, appendectomy (03/05/2020)  Resection of Appendix, Percutaneous Endoscopic Approach (03/05/2020)  none   Medications  testosterone cypionate 200 mg/mL intramuscular solution, 200 mg= 1 mL, IM, q2wk, 1 refills  Zofran ODT 4 mg oral tablet, disintegrating, 4 mg= 1 tab(s), Oral, Once  Zofran ODT 4 mg oral tablet, disintegrating, 4 mg= 1 tab(s), Oral, q8hr, PRN  Allergies  No Known Medication Allergies      I agree  with resident documentation. I was physically present, supervised resident, ?and discussed plan of care.  doing well, return to clinic prn

## 2022-06-09 ENCOUNTER — OFFICE VISIT (OUTPATIENT)
Dept: URGENT CARE | Facility: CLINIC | Age: 37
End: 2022-06-09
Payer: COMMERCIAL

## 2022-06-09 VITALS
TEMPERATURE: 98 F | HEIGHT: 71 IN | WEIGHT: 177 LBS | HEART RATE: 59 BPM | OXYGEN SATURATION: 98 % | SYSTOLIC BLOOD PRESSURE: 136 MMHG | BODY MASS INDEX: 24.78 KG/M2 | RESPIRATION RATE: 18 BRPM | DIASTOLIC BLOOD PRESSURE: 75 MMHG

## 2022-06-09 DIAGNOSIS — M79.2 NEURITIS: Primary | ICD-10-CM

## 2022-06-09 PROCEDURE — 99213 OFFICE O/P EST LOW 20 MIN: CPT | Mod: S$PBB,,, | Performed by: FAMILY MEDICINE

## 2022-06-09 PROCEDURE — 99213 PR OFFICE/OUTPT VISIT, EST, LEVL III, 20-29 MIN: ICD-10-PCS | Mod: S$PBB,,, | Performed by: FAMILY MEDICINE

## 2022-06-09 RX ORDER — PREDNISONE 20 MG/1
20 TABLET ORAL 2 TIMES DAILY
Qty: 6 TABLET | Refills: 0 | Status: SHIPPED | OUTPATIENT
Start: 2022-06-09 | End: 2022-06-12

## 2022-06-09 RX ORDER — TESTOSTERONE CYPIONATE 200 MG/ML
200 INJECTION, SOLUTION INTRAMUSCULAR
COMMUNITY
Start: 2022-01-21 | End: 2022-08-29

## 2022-06-09 NOTE — PATIENT INSTRUCTIONS
Stretching of neck.  Likely a pinch from the C spine down to the hand.  Monitor for strength loss.  Consider EMG studies, PT, etc if worsening.

## 2022-06-09 NOTE — PROGRESS NOTES
"Subjective:       Patient ID: Eliot Montiel is a 36 y.o. male.    Vitals:  height is 5' 11" (1.803 m) and weight is 80.3 kg (177 lb). His oral temperature is 98 °F (36.7 °C). His blood pressure is 136/75 and his pulse is 59 (abnormal). His respiration is 18 and oxygen saturation is 98%.     Chief Complaint: Arm Pain (Right arm pain. Shooting pain in right arm this morning (6-9-22) x 1 day. Hx of arm pain on and off x 5 years)    Right arm pain. Shooting pain in right arm this morning (6-9-22) x 1 day. Hx of arm pain on and off x 5 years    Arm Pain   The incident occurred 6 to 12 hours ago. The incident occurred at home. There was no injury mechanism. The pain is present in the right elbow, right fingers, right forearm, right hand and right shoulder (feels tingling from fingers to shoulder; pain typically localized from right forearm to bicep). The quality of the pain is described as shooting (sharp pain ). The pain is at a severity of 2/10. The pain is mild. The pain has been fluctuating (fluctuates depending on activity) since the incident. The symptoms are aggravated by movement and lifting. He has tried elevation, heat and ice (generic icy hot) for the symptoms. The treatment provided moderate relief.     ROS    Objective:      Physical Exam      Assessment:       No diagnosis found.      Plan:         There are no diagnoses linked to this encounter.               "

## 2022-06-09 NOTE — PROGRESS NOTES
"Subjective:       Patient ID: Eliot Montiel is a 36 y.o. male.    Vitals:  height is 5' 11" (1.803 m) and weight is 80.3 kg (177 lb). His oral temperature is 98 °F (36.7 °C). His blood pressure is 136/75 and his pulse is 59 (abnormal). His respiration is 18 and oxygen saturation is 98%.     Chief Complaint: Arm Pain (Right arm pain. Shooting pain in right arm this morning (6-9-22) x 1 day. Hx of arm pain on and off x 5 years)    37 yo F with hx of arm pain x 5 days presents for recurrent pain that started again today as a sharp pain of the arm, radial side.       Constitution: Negative for fever.   Respiratory: Negative for shortness of breath.    Musculoskeletal: Positive for pain. Negative for trauma and joint pain.       Objective:      Physical Exam   Cardiovascular: Normal rate and normal pulses.   Pulmonary/Chest: Effort normal.   Abdominal: Normal appearance.   Musculoskeletal:         General: No swelling.      Comments: No TTP of RUE, FROM of C spine, FROM of R shoulder, elbow and wrist, neg tinel's RUE   Neurological: no focal deficit. He is alert.   Skin: Skin is no rash.   Psychiatric: Mood normal.         Assessment:       1. Neuritis          Plan:         Neuritis  -     predniSONE (DELTASONE) 20 MG tablet; Take 1 tablet (20 mg total) by mouth 2 (two) times daily. for 3 days  Dispense: 6 tablet; Refill: 0             No red flags today.  Will treat inflammation and see how patient does today.         "

## 2022-06-28 DIAGNOSIS — R53.83 FATIGUE, UNSPECIFIED TYPE: Primary | ICD-10-CM

## 2022-06-28 DIAGNOSIS — Z00.00 WELLNESS EXAMINATION: ICD-10-CM

## 2022-07-06 ENCOUNTER — LAB VISIT (OUTPATIENT)
Dept: LAB | Facility: HOSPITAL | Age: 37
End: 2022-07-06
Attending: INTERNAL MEDICINE
Payer: COMMERCIAL

## 2022-07-06 DIAGNOSIS — Z00.00 WELLNESS EXAMINATION: ICD-10-CM

## 2022-07-06 DIAGNOSIS — R53.83 FATIGUE, UNSPECIFIED TYPE: ICD-10-CM

## 2022-07-06 LAB
ALBUMIN SERPL-MCNC: 4.4 GM/DL (ref 3.5–5)
ALBUMIN/GLOB SERPL: 1.5 RATIO (ref 1.1–2)
ALP SERPL-CCNC: 88 UNIT/L (ref 40–150)
ALT SERPL-CCNC: 43 UNIT/L (ref 0–55)
AST SERPL-CCNC: 34 UNIT/L (ref 5–34)
BASOPHILS # BLD AUTO: 0.03 X10(3)/MCL (ref 0–0.2)
BASOPHILS NFR BLD AUTO: 0.6 %
BILIRUBIN DIRECT+TOT PNL SERPL-MCNC: 0.7 MG/DL
BUN SERPL-MCNC: 11.9 MG/DL (ref 8.9–20.6)
CALCIUM SERPL-MCNC: 9.8 MG/DL (ref 8.4–10.2)
CHLORIDE SERPL-SCNC: 108 MMOL/L (ref 98–107)
CHOLEST SERPL-MCNC: 170 MG/DL
CHOLEST/HDLC SERPL: 4 {RATIO} (ref 0–5)
CO2 SERPL-SCNC: 29 MMOL/L (ref 22–29)
CREAT SERPL-MCNC: 1.23 MG/DL (ref 0.73–1.18)
EOSINOPHIL # BLD AUTO: 0.09 X10(3)/MCL (ref 0–0.9)
EOSINOPHIL NFR BLD AUTO: 1.7 %
ERYTHROCYTE [DISTWIDTH] IN BLOOD BY AUTOMATED COUNT: 12.4 % (ref 11.5–17)
GLOBULIN SER-MCNC: 3 GM/DL (ref 2.4–3.5)
GLUCOSE SERPL-MCNC: 90 MG/DL (ref 74–100)
HCT VFR BLD AUTO: 50.5 % (ref 42–52)
HDLC SERPL-MCNC: 48 MG/DL (ref 35–60)
HGB BLD-MCNC: 17.6 GM/DL (ref 14–18)
IMM GRANULOCYTES # BLD AUTO: 0.01 X10(3)/MCL (ref 0–0.04)
IMM GRANULOCYTES NFR BLD AUTO: 0.2 %
LDLC SERPL CALC-MCNC: 98 MG/DL (ref 50–140)
LYMPHOCYTES # BLD AUTO: 1.42 X10(3)/MCL (ref 0.6–4.6)
LYMPHOCYTES NFR BLD AUTO: 26.2 %
MCH RBC QN AUTO: 31.6 PG (ref 27–31)
MCHC RBC AUTO-ENTMCNC: 34.9 MG/DL (ref 33–36)
MCV RBC AUTO: 90.7 FL (ref 80–94)
MONOCYTES # BLD AUTO: 0.71 X10(3)/MCL (ref 0.1–1.3)
MONOCYTES NFR BLD AUTO: 13.1 %
NEUTROPHILS # BLD AUTO: 3.2 X10(3)/MCL (ref 2.1–9.2)
NEUTROPHILS NFR BLD AUTO: 58.2 %
NRBC BLD AUTO-RTO: 0 %
PLATELET # BLD AUTO: 251 X10(3)/MCL (ref 130–400)
PMV BLD AUTO: 9.6 FL (ref 7.4–10.4)
POTASSIUM SERPL-SCNC: 5.1 MMOL/L (ref 3.5–5.1)
PROT SERPL-MCNC: 7.4 GM/DL (ref 6.4–8.3)
RBC # BLD AUTO: 5.57 X10(6)/MCL (ref 4.7–6.1)
SODIUM SERPL-SCNC: 142 MMOL/L (ref 136–145)
TESTOST SERPL-MCNC: 96.66 NG/DL (ref 240.24–870.68)
TRIGL SERPL-MCNC: 119 MG/DL (ref 34–140)
TSH SERPL-ACNC: 0.75 UIU/ML (ref 0.35–4.94)
VLDLC SERPL CALC-MCNC: 24 MG/DL
WBC # SPEC AUTO: 5.4 X10(3)/MCL (ref 4.5–11.5)

## 2022-07-06 PROCEDURE — 80061 LIPID PANEL: CPT

## 2022-07-06 PROCEDURE — 84403 ASSAY OF TOTAL TESTOSTERONE: CPT

## 2022-07-06 PROCEDURE — 84443 ASSAY THYROID STIM HORMONE: CPT

## 2022-07-06 PROCEDURE — 80053 COMPREHEN METABOLIC PANEL: CPT

## 2022-07-06 PROCEDURE — 36415 COLL VENOUS BLD VENIPUNCTURE: CPT

## 2022-07-06 PROCEDURE — 85025 COMPLETE CBC W/AUTO DIFF WBC: CPT

## 2022-07-20 ENCOUNTER — TELEPHONE (OUTPATIENT)
Dept: INTERNAL MEDICINE | Facility: CLINIC | Age: 37
End: 2022-07-20
Payer: COMMERCIAL

## 2022-07-20 NOTE — TELEPHONE ENCOUNTER
----- Message from Cheryl Lejeune sent at 7/20/2022  8:32 AM CDT -----  Regarding: RE: pv wendy 7/27 0900  Pt informed of OV and check in protocol.  He verbalized understanding.   ----- Message -----  From: Rosa Duncan LPN  Sent: 7/19/2022   9:55 AM CDT  To: Josephine Lejeune  Subject: pv wendy 7/27 0900                               Are there any outstanding tasks in chart? No    Is there any documentation of tasks? No    Has the pt seen another physician, been to ER, C, or admitted to hospital since last visit?    Has the pt done blood work or imaging since last visit?

## 2022-07-27 ENCOUNTER — OFFICE VISIT (OUTPATIENT)
Dept: INTERNAL MEDICINE | Facility: CLINIC | Age: 37
End: 2022-07-27
Payer: COMMERCIAL

## 2022-07-27 VITALS
SYSTOLIC BLOOD PRESSURE: 132 MMHG | BODY MASS INDEX: 26.46 KG/M2 | OXYGEN SATURATION: 98 % | DIASTOLIC BLOOD PRESSURE: 82 MMHG | HEIGHT: 71 IN | HEART RATE: 62 BPM | WEIGHT: 189 LBS | RESPIRATION RATE: 16 BRPM

## 2022-07-27 DIAGNOSIS — R79.89 LOW TESTOSTERONE IN MALE: ICD-10-CM

## 2022-07-27 DIAGNOSIS — D75.1 POLYCYTHEMIA: ICD-10-CM

## 2022-07-27 DIAGNOSIS — Z00.00 ANNUAL PHYSICAL EXAM: Primary | ICD-10-CM

## 2022-07-27 PROCEDURE — 3079F PR MOST RECENT DIASTOLIC BLOOD PRESSURE 80-89 MM HG: ICD-10-PCS | Mod: CPTII,,, | Performed by: INTERNAL MEDICINE

## 2022-07-27 PROCEDURE — 3008F PR BODY MASS INDEX (BMI) DOCUMENTED: ICD-10-PCS | Mod: CPTII,,, | Performed by: INTERNAL MEDICINE

## 2022-07-27 PROCEDURE — 3075F PR MOST RECENT SYSTOLIC BLOOD PRESS GE 130-139MM HG: ICD-10-PCS | Mod: CPTII,,, | Performed by: INTERNAL MEDICINE

## 2022-07-27 PROCEDURE — 1160F PR REVIEW ALL MEDS BY PRESCRIBER/CLIN PHARMACIST DOCUMENTED: ICD-10-PCS | Mod: CPTII,,, | Performed by: INTERNAL MEDICINE

## 2022-07-27 PROCEDURE — 1159F MED LIST DOCD IN RCRD: CPT | Mod: CPTII,,, | Performed by: INTERNAL MEDICINE

## 2022-07-27 PROCEDURE — 3075F SYST BP GE 130 - 139MM HG: CPT | Mod: CPTII,,, | Performed by: INTERNAL MEDICINE

## 2022-07-27 PROCEDURE — 1160F RVW MEDS BY RX/DR IN RCRD: CPT | Mod: CPTII,,, | Performed by: INTERNAL MEDICINE

## 2022-07-27 PROCEDURE — 99395 PREV VISIT EST AGE 18-39: CPT | Mod: ,,, | Performed by: INTERNAL MEDICINE

## 2022-07-27 PROCEDURE — 3008F BODY MASS INDEX DOCD: CPT | Mod: CPTII,,, | Performed by: INTERNAL MEDICINE

## 2022-07-27 PROCEDURE — 1159F PR MEDICATION LIST DOCUMENTED IN MEDICAL RECORD: ICD-10-PCS | Mod: CPTII,,, | Performed by: INTERNAL MEDICINE

## 2022-07-27 PROCEDURE — 3079F DIAST BP 80-89 MM HG: CPT | Mod: CPTII,,, | Performed by: INTERNAL MEDICINE

## 2022-07-27 PROCEDURE — 99395 PR PREVENTIVE VISIT,EST,18-39: ICD-10-PCS | Mod: ,,, | Performed by: INTERNAL MEDICINE

## 2022-07-27 NOTE — PROGRESS NOTES
Patient ID: Eliot Montiel is a 36 y.o. male.    Chief Complaint: Annual Exam (Review testosterone level) and right foot concerns ongoing since Monday      HPI:   Patient presents here today for above reason.       The patient's Health Maintenance was reviewed and the following appears to be due at this time:   Health Maintenance Due   Topic Date Due    Hepatitis C Screening  Never done    COVID-19 Vaccine (1) Never done    HIV Screening  Never done    TETANUS VACCINE  Never done        Past Medical History:  Past Medical History:   Diagnosis Date    Known health problems: none      Past Surgical History:   Procedure Laterality Date    APPENDECTOMY      CHOLECYSTECTOMY      VASECTOMY       Review of patient's allergies indicates:  No Known Allergies  Current Outpatient Medications on File Prior to Visit   Medication Sig Dispense Refill    testosterone cypionate (DEPOTESTOTERONE CYPIONATE) 200 mg/mL injection Inject 200 mg into the muscle every 14 (fourteen) days.       No current facility-administered medications on file prior to visit.     Social History     Socioeconomic History    Marital status:    Tobacco Use    Smoking status: Former Smoker     Types: Cigarettes    Smokeless tobacco: Never Used   Substance and Sexual Activity    Alcohol use: Yes     Alcohol/week: 7.0 standard drinks     Types: 7 Drinks containing 0.5 oz of alcohol per week    Drug use: Never    Sexual activity: Yes     Family History   Problem Relation Age of Onset    No Known Problems Mother     No Known Problems Father     No Known Problems Sister     No Known Problems Brother        ROS:   Review of Systems  Constitutional: No weight gain, No fever, No chills, No fatigue.   Eyes: No blurring, No visual disturbances.   Ear/Nose/Mouth/Throat: No decreased hearing, No ear pain, No nasal congestion, No sore throat.   Respiratory: No shortness of breath, No cough, No wheezing.   Cardiovascular: No chest pain, No  "palpitations, No peripheral edema.   Gastrointestinal: No nausea, No vomiting, No diarrhea, No constipation, No abdominal pain.   Genitourinary: No dysuria, No hematuria.   Hematology/Lymphatics: No bruising tendency, No bleeding tendency, No swollen lymph glands.   Endocrine: No excessive thirst, No polyuria, No excessive hunger.   Musculoskeletal:   Bruise plantar area right foot  , no pain  No muscle pain, No decreased range of motion.   Integumentary: No rash, No pruritus.   Neurologic: No abnormal balance, No confusion, No headache.   Psychiatric: No anxiety, No depression, Not suicidal, No hallucinations.      Vitals/PE:   /82 (BP Location: Left arm)   Pulse 62   Resp 16   Ht 5' 10.98" (1.803 m)   Wt 85.7 kg (189 lb)   SpO2 98%   BMI 26.37 kg/m²   Physical Exam    General: Alert and oriented, No acute distress.   Eye: Normal conjunctiva without exudate.  HENMT: Normocephalic/AT, Normal hearing, Oral mucosa is moist and pink   Neck: No goiter visualized.   Respiratory: Lungs CTAB, Respirations are non-labored, Breath sounds are equal, Symmetrical chest wall expansion.  Cardiovascular: Normal rate, Regular rhythm, No murmur, No edema.   Gastrointestinal: Non-distended.   Genitourinary: Deferred.  Musculoskeletal: Normal ROM, Normal gait, No deformities or amputations. Palpable bruise midsole   Integumentary: Warm, Dry, Intact. No diaphoresis, or flushing.  Neurologic: No focal deficits, Cranial Nerves II-XII are grossly intact.   Psychiatric: Cooperative, Appropriate mood & affect, Normal judgment, Non-suicidal.    Assessment/Plan:   ..  Problem List Items Addressed This Visit    None     Visit Diagnoses     Annual physical exam    -  Primary    Low testosterone in male        Relevant Orders    Testosterone    Polycythemia        Relevant Orders    CBC Auto Differential         Recommendations:  Diet (healthy food choices, reduce portions and overall calorie intake)  Exercise 30-45 minutes at least " 3x per week  Avoid excessive alcohol intake and tobacco use  Stay UTD with immunizations and preventative screenings   Yearly Labs     ..  all labs  Discussed , all Ok  Except polycethemia  Recommend  Therapeutic phlebotomy   RTC  6 months  CBC total test    ..  Orders Placed This Encounter   Procedures    CBC Auto Differential    Testosterone         I am having Eliot Montiel maintain his testosterone cypionate.    Orders Placed This Encounter   Procedures    CBC Auto Differential     Standing Status:   Future     Standing Expiration Date:   9/25/2023    Testosterone     Standing Status:   Future     Standing Expiration Date:   9/25/2023       Education and counseling done face to face regarding medical conditions and plan. Contact office if new symptoms develop. Should any symptoms ever significantly worsen seek emergency medical attention/go to ER. Follow up at least yearly for wellness or sooner PRN. Nurse to call patient with any results. The patient is receptive, expresses understanding and is agreeable to plan. All questions have been answered.    Follow up in about 6 months (around 1/27/2023).

## 2022-09-20 ENCOUNTER — TELEPHONE (OUTPATIENT)
Dept: INTERNAL MEDICINE | Facility: CLINIC | Age: 37
End: 2022-09-20
Payer: COMMERCIAL

## 2023-01-25 ENCOUNTER — TELEPHONE (OUTPATIENT)
Dept: INTERNAL MEDICINE | Facility: CLINIC | Age: 38
End: 2023-01-25
Payer: COMMERCIAL

## 2023-01-25 DIAGNOSIS — Z79.899 HIGH RISK MEDICATION USE: ICD-10-CM

## 2023-01-25 DIAGNOSIS — R79.89 LOW TESTOSTERONE IN MALE: Primary | ICD-10-CM

## 2023-01-25 DIAGNOSIS — D75.1 POLYCYTHEMIA: ICD-10-CM

## 2023-01-25 NOTE — TELEPHONE ENCOUNTER
----- Message from Rosa Duncan LPN sent at 1/25/2023  2:29 PM CST -----  Regarding: pv wendy 2/1 0900  Are there any outstanding tasks in chart? No, but needs FASTING labs PRIOR to appt    Is there any documentation of tasks? no    Has the pt seen another physician, been to ER, [facility], or admitted to hospital since last visit?    Has the pt done blood work or imaging since last visit?

## 2023-02-01 ENCOUNTER — OFFICE VISIT (OUTPATIENT)
Dept: INTERNAL MEDICINE | Facility: CLINIC | Age: 38
End: 2023-02-01
Payer: COMMERCIAL

## 2023-02-01 ENCOUNTER — LAB VISIT (OUTPATIENT)
Dept: LAB | Facility: HOSPITAL | Age: 38
End: 2023-02-01
Attending: INTERNAL MEDICINE
Payer: COMMERCIAL

## 2023-02-01 VITALS
HEIGHT: 71 IN | SYSTOLIC BLOOD PRESSURE: 130 MMHG | WEIGHT: 189 LBS | OXYGEN SATURATION: 98 % | RESPIRATION RATE: 18 BRPM | HEART RATE: 65 BPM | BODY MASS INDEX: 26.46 KG/M2 | DIASTOLIC BLOOD PRESSURE: 78 MMHG

## 2023-02-01 DIAGNOSIS — D75.1 POLYCYTHEMIA: ICD-10-CM

## 2023-02-01 DIAGNOSIS — R79.89 LOW TESTOSTERONE IN MALE: ICD-10-CM

## 2023-02-01 DIAGNOSIS — Z79.899 HIGH RISK MEDICATION USE: ICD-10-CM

## 2023-02-01 DIAGNOSIS — Z00.00 ANNUAL PHYSICAL EXAM: Primary | ICD-10-CM

## 2023-02-01 LAB
ALBUMIN SERPL-MCNC: 4.3 G/DL (ref 3.5–5)
ALBUMIN/GLOB SERPL: 1.4 RATIO (ref 1.1–2)
ALP SERPL-CCNC: 83 UNIT/L (ref 40–150)
ALT SERPL-CCNC: 27 UNIT/L (ref 0–55)
AST SERPL-CCNC: 22 UNIT/L (ref 5–34)
BASOPHILS # BLD AUTO: 0.05 X10(3)/MCL (ref 0–0.2)
BASOPHILS NFR BLD AUTO: 0.8 %
BILIRUBIN DIRECT+TOT PNL SERPL-MCNC: 0.8 MG/DL
BUN SERPL-MCNC: 13.8 MG/DL (ref 8.9–20.6)
CALCIUM SERPL-MCNC: 9.6 MG/DL (ref 8.4–10.2)
CHLORIDE SERPL-SCNC: 108 MMOL/L (ref 98–107)
CO2 SERPL-SCNC: 27 MMOL/L (ref 22–29)
CREAT SERPL-MCNC: 1.05 MG/DL (ref 0.73–1.18)
EOSINOPHIL # BLD AUTO: 0.16 X10(3)/MCL (ref 0–0.9)
EOSINOPHIL NFR BLD AUTO: 2.5 %
ERYTHROCYTE [DISTWIDTH] IN BLOOD BY AUTOMATED COUNT: 12.1 % (ref 11.5–17)
GFR SERPLBLD CREATININE-BSD FMLA CKD-EPI: >60 MLS/MIN/1.73/M2
GLOBULIN SER-MCNC: 3 GM/DL (ref 2.4–3.5)
GLUCOSE SERPL-MCNC: 88 MG/DL (ref 74–100)
HCT VFR BLD AUTO: 47.2 % (ref 42–52)
HGB BLD-MCNC: 16.1 GM/DL (ref 14–18)
IMM GRANULOCYTES # BLD AUTO: 0.01 X10(3)/MCL (ref 0–0.04)
IMM GRANULOCYTES NFR BLD AUTO: 0.2 %
LYMPHOCYTES # BLD AUTO: 1.91 X10(3)/MCL (ref 0.6–4.6)
LYMPHOCYTES NFR BLD AUTO: 29.4 %
MCH RBC QN AUTO: 30.8 PG
MCHC RBC AUTO-ENTMCNC: 34.1 MG/DL (ref 33–36)
MCV RBC AUTO: 90.4 FL (ref 80–94)
MONOCYTES # BLD AUTO: 0.44 X10(3)/MCL (ref 0.1–1.3)
MONOCYTES NFR BLD AUTO: 6.8 %
NEUTROPHILS # BLD AUTO: 3.93 X10(3)/MCL (ref 2.1–9.2)
NEUTROPHILS NFR BLD AUTO: 60.3 %
NRBC BLD AUTO-RTO: 0 %
PLATELET # BLD AUTO: 313 X10(3)/MCL (ref 130–400)
PMV BLD AUTO: 9.3 FL (ref 7.4–10.4)
POTASSIUM SERPL-SCNC: 4.3 MMOL/L (ref 3.5–5.1)
PROT SERPL-MCNC: 7.3 GM/DL (ref 6.4–8.3)
RBC # BLD AUTO: 5.22 X10(6)/MCL (ref 4.7–6.1)
SODIUM SERPL-SCNC: 141 MMOL/L (ref 136–145)
TESTOST SERPL-MCNC: 470 NG/DL (ref 240.24–870.68)
WBC # SPEC AUTO: 6.5 X10(3)/MCL (ref 4.5–11.5)

## 2023-02-01 PROCEDURE — 84403 ASSAY OF TOTAL TESTOSTERONE: CPT

## 2023-02-01 PROCEDURE — 85025 COMPLETE CBC W/AUTO DIFF WBC: CPT

## 2023-02-01 PROCEDURE — 99395 PR PREVENTIVE VISIT,EST,18-39: ICD-10-PCS | Mod: ,,, | Performed by: INTERNAL MEDICINE

## 2023-02-01 PROCEDURE — 36415 COLL VENOUS BLD VENIPUNCTURE: CPT

## 2023-02-01 PROCEDURE — 80053 COMPREHEN METABOLIC PANEL: CPT

## 2023-02-01 PROCEDURE — 99395 PREV VISIT EST AGE 18-39: CPT | Mod: ,,, | Performed by: INTERNAL MEDICINE

## 2023-02-01 NOTE — PROGRESS NOTES
Patient ID: Eliot Montiel is a 37 y.o. male.    Chief Complaint: Follow-up (6 mo-will do labs after appt)      HPI:   Patient presents here today for above reason.         The patient's Health Maintenance was reviewed and the following appears to be due at this time:   Health Maintenance Due   Topic Date Due    Hepatitis C Screening  Never done    COVID-19 Vaccine (1) Never done    HIV Screening  Never done    TETANUS VACCINE  Never done    Influenza Vaccine (1) Never done        Past Medical History:  Past Medical History:   Diagnosis Date    Known health problems: none      Past Surgical History:   Procedure Laterality Date    APPENDECTOMY      CHOLECYSTECTOMY      VASECTOMY       Review of patient's allergies indicates:  No Known Allergies  Current Outpatient Medications on File Prior to Visit   Medication Sig Dispense Refill    testosterone cypionate (DEPOTESTOTERONE CYPIONATE) 200 mg/mL injection INJECT 1 ML IN THE MUSCLE EVERY 2 WEEKS 10 mL 3     No current facility-administered medications on file prior to visit.     Social History     Socioeconomic History    Marital status:    Tobacco Use    Smoking status: Former     Types: Cigarettes    Smokeless tobacco: Never   Substance and Sexual Activity    Alcohol use: Yes     Alcohol/week: 7.0 standard drinks     Types: 7 Drinks containing 0.5 oz of alcohol per week    Drug use: Never    Sexual activity: Yes     Family History   Problem Relation Age of Onset    No Known Problems Mother     No Known Problems Father     No Known Problems Sister     No Known Problems Brother        ROS:   Review of Systems  Constitutional: No weight gain, No fever, No chills, No fatigue.   Eyes: No blurring, No visual disturbances.   Ear/Nose/Mouth/Throat: No decreased hearing, No ear pain, No nasal congestion, No sore throat.   Respiratory: No shortness of breath, No cough, No wheezing.   Cardiovascular: No chest pain, No palpitations, No peripheral edema.  "  Gastrointestinal: No nausea, No vomiting, No diarrhea, No constipation, No abdominal pain.   Genitourinary: No dysuria, No hematuria. No nocturia   Hematology/Lymphatics: No bruising tendency, No bleeding tendency, No swollen lymph glands.   Endocrine: No excessive thirst, No polyuria, No excessive hunger.   Musculoskeletal: No joint pain, No muscle pain, No decreased range of motion.   Integumentary: No rash, No pruritus.   Neurologic: No abnormal balance, No confusion, No headache.   Psychiatric: No anxiety, No depression, Not suicidal, No hallucinations.      Vitals/PE:   /78 (BP Location: Left arm, Patient Position: Sitting)   Pulse 65   Resp 18   Ht 5' 10.98" (1.803 m)   Wt 85.7 kg (189 lb)   SpO2 98%   BMI 26.37 kg/m²   Physical Exam    General: Alert and oriented, No acute distress.   Eye: Normal conjunctiva without exudate.  HENMT: Normocephalic/AT, Normal hearing, Oral mucosa is moist and pink   Neck: No goiter visualized.   Respiratory: Lungs CTAB, Respirations are non-labored, Breath sounds are equal, Symmetrical chest wall expansion.  Cardiovascular: Normal rate, Regular rhythm, No murmur, No edema.   Gastrointestinal: Non-distended.   Genitourinary: Deferred.  Musculoskeletal: Normal ROM, Normal gait, No deformities or amputations.  Integumentary: Warm, Dry, Intact. No diaphoresis, or flushing.  Neurologic: No focal deficits, Cranial Nerves II-XII are grossly intact.   Psychiatric: Cooperative, Appropriate mood & affect, Normal judgment, Non-suicidal.    Assessment/Plan:   ..  Problem List Items Addressed This Visit          Endocrine    Low testosterone in male       Other    Annual physical exam - Primary      Recommendations:  Diet (healthy food choices, reduce portions and overall calorie intake)  Exercise 30-45 minutes at least 3x per week  Avoid excessive alcohol intake and tobacco use  Stay UTD with immunizations and preventative screenings   Yearly Labs     ..      ..No orders of " the defined types were placed in this encounter.        I am having Eliot Montiel maintain his testosterone cypionate.    No orders of the defined types were placed in this encounter.      Will do blood work  today  , keep therapeutic rhlebotomy   Education and counseling done face to face regarding medical conditions and plan. Contact office if new symptoms develop. Should any symptoms ever significantly worsen seek emergency medical attention/go to ER. Follow up at least yearly for wellness or sooner PRN. Nurse to call patient with any results. The patient is receptive, expresses understanding and is agreeable to plan. All questions have been answered.    No follow-ups on file.

## 2023-05-08 PROBLEM — Z00.00 ANNUAL PHYSICAL EXAM: Status: RESOLVED | Noted: 2023-02-01 | Resolved: 2023-05-08

## 2023-07-26 ENCOUNTER — OFFICE VISIT (OUTPATIENT)
Dept: INTERNAL MEDICINE | Facility: CLINIC | Age: 38
End: 2023-07-26
Payer: COMMERCIAL

## 2023-07-26 VITALS
SYSTOLIC BLOOD PRESSURE: 100 MMHG | HEIGHT: 70 IN | HEART RATE: 67 BPM | DIASTOLIC BLOOD PRESSURE: 60 MMHG | BODY MASS INDEX: 27.2 KG/M2 | OXYGEN SATURATION: 98 % | WEIGHT: 190 LBS

## 2023-07-26 DIAGNOSIS — R79.89 LOW TESTOSTERONE IN MALE: ICD-10-CM

## 2023-07-26 DIAGNOSIS — Z00.00 ANNUAL PHYSICAL EXAM: Primary | ICD-10-CM

## 2023-07-26 PROCEDURE — 1160F RVW MEDS BY RX/DR IN RCRD: CPT | Mod: CPTII,,, | Performed by: INTERNAL MEDICINE

## 2023-07-26 PROCEDURE — 1160F PR REVIEW ALL MEDS BY PRESCRIBER/CLIN PHARMACIST DOCUMENTED: ICD-10-PCS | Mod: CPTII,,, | Performed by: INTERNAL MEDICINE

## 2023-07-26 PROCEDURE — 3078F DIAST BP <80 MM HG: CPT | Mod: CPTII,,, | Performed by: INTERNAL MEDICINE

## 2023-07-26 PROCEDURE — 3008F PR BODY MASS INDEX (BMI) DOCUMENTED: ICD-10-PCS | Mod: CPTII,,, | Performed by: INTERNAL MEDICINE

## 2023-07-26 PROCEDURE — 1159F PR MEDICATION LIST DOCUMENTED IN MEDICAL RECORD: ICD-10-PCS | Mod: CPTII,,, | Performed by: INTERNAL MEDICINE

## 2023-07-26 PROCEDURE — 99395 PR PREVENTIVE VISIT,EST,18-39: ICD-10-PCS | Mod: ,,, | Performed by: INTERNAL MEDICINE

## 2023-07-26 PROCEDURE — 3074F PR MOST RECENT SYSTOLIC BLOOD PRESSURE < 130 MM HG: ICD-10-PCS | Mod: CPTII,,, | Performed by: INTERNAL MEDICINE

## 2023-07-26 PROCEDURE — 1159F MED LIST DOCD IN RCRD: CPT | Mod: CPTII,,, | Performed by: INTERNAL MEDICINE

## 2023-07-26 PROCEDURE — 3008F BODY MASS INDEX DOCD: CPT | Mod: CPTII,,, | Performed by: INTERNAL MEDICINE

## 2023-07-26 PROCEDURE — 3078F PR MOST RECENT DIASTOLIC BLOOD PRESSURE < 80 MM HG: ICD-10-PCS | Mod: CPTII,,, | Performed by: INTERNAL MEDICINE

## 2023-07-26 PROCEDURE — 99395 PREV VISIT EST AGE 18-39: CPT | Mod: ,,, | Performed by: INTERNAL MEDICINE

## 2023-07-26 PROCEDURE — 3074F SYST BP LT 130 MM HG: CPT | Mod: CPTII,,, | Performed by: INTERNAL MEDICINE

## 2023-07-26 NOTE — PROGRESS NOTES
Patient ID: Eliot Montiel is a 37 y.o. male.    Chief Complaint: Follow-up (6 month)      HPI:   Patient presents here today for above reason.         All labs  discussed all OK  testosterone   The patient's Health Maintenance was reviewed and the following appears to be due at this time:   Health Maintenance Due   Topic Date Due    Hepatitis C Screening  Never done    COVID-19 Vaccine (1) Never done    HIV Screening  Never done    TETANUS VACCINE  Never done    Hemoglobin A1c (Diabetic Prevention Screening)  Never done        Past Medical History:  Past Medical History:   Diagnosis Date    Known health problems: none      Past Surgical History:   Procedure Laterality Date    APPENDECTOMY      CHOLECYSTECTOMY      VASECTOMY       Review of patient's allergies indicates:  No Known Allergies  Current Outpatient Medications on File Prior to Visit   Medication Sig Dispense Refill    testosterone cypionate (DEPOTESTOTERONE CYPIONATE) 200 mg/mL injection INJECT 1 ML IN THE MUSCLE EVERY 2 WEEKS 10 mL 3     No current facility-administered medications on file prior to visit.     Social History     Socioeconomic History    Marital status:    Tobacco Use    Smoking status: Former     Types: Cigarettes    Smokeless tobacco: Never   Substance and Sexual Activity    Alcohol use: Yes     Alcohol/week: 7.0 standard drinks     Types: 7 Drinks containing 0.5 oz of alcohol per week    Drug use: Never    Sexual activity: Yes     Family History   Problem Relation Age of Onset    No Known Problems Mother     No Known Problems Father     No Known Problems Sister     No Known Problems Brother        ROS:   Review of Systems  Constitutional: No weight gain, No fever, No chills, No fatigue.   Eyes: No blurring, No visual disturbances.   Ear/Nose/Mouth/Throat: No decreased hearing, No ear pain, No nasal congestion, No sore throat.   Respiratory: No shortness of breath, No cough, No wheezing.   Cardiovascular: No chest pain, No  "palpitations, No peripheral edema.   Gastrointestinal: No nausea, No vomiting, No diarrhea, No constipation, No abdominal pain.   Genitourinary: No dysuria, No hematuria.   Hematology/Lymphatics: No bruising tendency, No bleeding tendency, No swollen lymph glands.   Endocrine: No excessive thirst, No polyuria, No excessive hunger.   Musculoskeletal: No joint pain, No muscle pain, No decreased range of motion.   Integumentary: No rash, No pruritus.   Neurologic: No abnormal balance, No confusion, No headache.   Psychiatric: No anxiety, No depression, Not suicidal, No hallucinations.      Vitals/PE:   /60   Pulse 67   Ht 5' 10" (1.778 m)   Wt 86.2 kg (190 lb)   SpO2 98%   BMI 27.26 kg/m²   Physical Exam    General: Alert and oriented, No acute distress.   Eye: Normal conjunctiva without exudate.  HENMT: Normocephalic/AT, Normal hearing, Oral mucosa is moist and pink   Neck: No goiter visualized.   Respiratory: Lungs CTAB, Respirations are non-labored, Breath sounds are equal, Symmetrical chest wall expansion.  Cardiovascular: Normal rate, Regular rhythm, No murmur, No edema.   Gastrointestinal: Non-distended.   Genitourinary: Deferred.  Musculoskeletal: Normal ROM, Normal gait, No deformities or amputations.  Integumentary: Warm, Dry, Intact. No diaphoresis, or flushing.  Neurologic: No focal deficits, Cranial Nerves II-XII are grossly intact.   Psychiatric: Cooperative, Appropriate mood & affect, Normal judgment, Non-suicidal.    Assessment/Plan:   ..  Problem List Items Addressed This Visit          Endocrine    Low testosterone in male    Relevant Orders    CBC Auto Differential    Testosterone       Other    Annual physical exam - Primary      Recommendations:  Diet (healthy food choices, reduce portions and overall calorie intake)  Exercise 30-45 minutes at least 3x per week  Avoid excessive alcohol intake and tobacco use  Stay UTD with immunizations and preventative screenings   Yearly Labs   All labs "  discussed all Ok  continue testosterone , improving .  No polycethemia   ..      ..  Orders Placed This Encounter   Procedures    CBC Auto Differential    Testosterone         I am having Eliot Montiel maintain his testosterone cypionate.    Orders Placed This Encounter   Procedures    CBC Auto Differential     Standing Status:   Future     Standing Expiration Date:   7/26/2024    Testosterone     Obtain a testosterone level in 6 months.     Standing Status:   Future     Standing Expiration Date:   7/26/2024       Education and counseling done face to face regarding medical conditions and plan. Contact office if new symptoms develop. Should any symptoms ever significantly worsen seek emergency medical attention/go to ER. Follow up at least yearly for wellness or sooner PRN. Nurse to call patient with any results. The patient is receptive, expresses understanding and is agreeable to plan. All questions have been answered.    Follow up in about 6 months (around 1/26/2024).

## 2023-10-12 RX ORDER — TESTOSTERONE CYPIONATE 200 MG/ML
INJECTION, SOLUTION INTRAMUSCULAR
Qty: 10 ML | Refills: 3 | Status: SHIPPED | OUTPATIENT
Start: 2023-10-12

## 2023-10-30 PROBLEM — Z00.00 ANNUAL PHYSICAL EXAM: Status: RESOLVED | Noted: 2023-02-01 | Resolved: 2023-10-30

## 2024-01-30 ENCOUNTER — TELEPHONE (OUTPATIENT)
Dept: INTERNAL MEDICINE | Facility: CLINIC | Age: 39
End: 2024-01-30
Payer: COMMERCIAL

## 2024-01-30 NOTE — TELEPHONE ENCOUNTER
----- Message from Rocio Katz LPN sent at 1/30/2024  8:31 AM CST -----  Regarding: Dr. Dumont 02/07/2024 6 month rv 3:20pm  Are there any outstanding tasks in chart? No    Is there any documentation of tasks? No    Has the pt seen another physician, been to ER, UCC, or admitted to hospital since last visit?    Has the pt done blood work or imaging since last visit?     5. PLEASE HAVE PATIENT BRING MEDICATION LIST OR BOTTLES TO EVERY OFFICE VISIT

## 2024-02-07 ENCOUNTER — OFFICE VISIT (OUTPATIENT)
Dept: INTERNAL MEDICINE | Facility: CLINIC | Age: 39
End: 2024-02-07
Payer: COMMERCIAL

## 2024-02-07 VITALS
DIASTOLIC BLOOD PRESSURE: 62 MMHG | WEIGHT: 190.19 LBS | BODY MASS INDEX: 27.23 KG/M2 | HEART RATE: 67 BPM | OXYGEN SATURATION: 96 % | HEIGHT: 70 IN | SYSTOLIC BLOOD PRESSURE: 102 MMHG

## 2024-02-07 DIAGNOSIS — R79.89 LOW TESTOSTERONE IN MALE: Primary | ICD-10-CM

## 2024-02-07 DIAGNOSIS — L98.9 SKIN LESION: ICD-10-CM

## 2024-02-07 DIAGNOSIS — Z00.00 ANNUAL PHYSICAL EXAM: ICD-10-CM

## 2024-02-07 PROCEDURE — 99214 OFFICE O/P EST MOD 30 MIN: CPT | Mod: ,,, | Performed by: INTERNAL MEDICINE

## 2024-02-07 PROCEDURE — 3008F BODY MASS INDEX DOCD: CPT | Mod: CPTII,,, | Performed by: INTERNAL MEDICINE

## 2024-02-07 PROCEDURE — 1159F MED LIST DOCD IN RCRD: CPT | Mod: CPTII,,, | Performed by: INTERNAL MEDICINE

## 2024-02-07 PROCEDURE — 3078F DIAST BP <80 MM HG: CPT | Mod: CPTII,,, | Performed by: INTERNAL MEDICINE

## 2024-02-07 PROCEDURE — 3074F SYST BP LT 130 MM HG: CPT | Mod: CPTII,,, | Performed by: INTERNAL MEDICINE

## 2024-02-07 PROCEDURE — 1160F RVW MEDS BY RX/DR IN RCRD: CPT | Mod: CPTII,,, | Performed by: INTERNAL MEDICINE

## 2024-02-07 NOTE — PROGRESS NOTES
"Patient ID: Eliot Montiel is a 38 y.o. male.  Chief Complaint: Follow-up (6 month )    HPI:    37 yo Low testosterone he will follow-up, patient more with the knee was on the right cheek area 100 the I slightly changing colors, will go ahead and refer him to a dermatologist  More  Prescription refills has been done for the testosterone, patient will return with a CBC testosterone level lipid panel   Other than that no other acute complaints      Current Outpatient Medications:     testosterone cypionate (DEPOTESTOTERONE CYPIONATE) 200 mg/mL injection, INJECT 1 ML IN THE MUSCLE EVERY 2 WEEKS, Disp: 10 mL, Rfl: 3  ROS:   Constitutional: No weight gain, No fever, No chills, No fatigue.   Eyes: No blurring, No visual disturbances.   Ear/Nose/Mouth/Throat: No decreased hearing, No ear pain, No nasal congestion, No sore throat.   Respiratory: No shortness of breath, No cough, No wheezing.   Cardiovascular: No chest pain, No palpitations, No peripheral edema.   Gastrointestinal: No nausea, No vomiting, No diarrhea, No constipation, No abdominal pain.   Genitourinary: No dysuria, No hematuria.   Hematology/Lymphatics: No bruising tendency, No bleeding tendency, No swollen lymph glands.   Endocrine: No excessive thirst, No polyuria, No excessive hunger.   Musculoskeletal: No joint pain, No muscle pain, No decreased range of motion.   Integumentary: No rash, No pruritus. Birth mary on right cheek,  changing  colors   Neurologic: No abnormal balance, No confusion, No headache.   Psychiatric: No anxiety, No depression, Not suicidal, No hallucinations.    PE/Vitals:   /62 (BP Location: Left arm, Patient Position: Sitting, BP Method: Medium (Manual))   Pulse 67   Ht 5' 10" (1.778 m)   Wt 86.3 kg (190 lb 3.2 oz)   SpO2 96%   BMI 27.29 kg/m²   General: Alert and oriented, No acute distress.   Eye: Normal conjunctiva without exudate.  HENMT: Normocephalic/AT, Normal hearing, Oral mucosa is moist and pink   Neck: " No goiter visualized.   Respiratory: Lungs CTAB, Respirations are non-labored, Breath sounds are equal, Symmetrical chest wall expansion.  Cardiovascular: Normal rate, Regular rhythm, No murmur, No edema.   Gastrointestinal: Non-distended.   Genitourinary: Deferred.  Musculoskeletal: Normal ROM, Normal gait, No deformities or amputations.  Integumentary: Warm, Dry, Intact. No diaphoresis, or flushing. Slighlty hyperpigmented  skin nevus   Neurologic: No focal deficits, Cranial Nerves II-XII are grossly intact.   Psychiatric: Cooperative, Appropriate mood & affect, Normal judgment, Non-suicidal.  Assessment/Plan   1. Low testosterone in male  Comments:  Rx refills  Orders:  -     CBC Auto Differential; Future; Expected date: 05/07/2024  -     Testosterone; Future; Expected date: 05/07/2024    2. Skin lesion  Comments:  refer to Dr SHAWNA Elam  Orders:  -     Ambulatory referral/consult to Dermatology; Future; Expected date: 02/14/2024    3. Annual physical exam  Comments:  labs  in  3 months  Orders:  -     CBC Auto Differential; Future; Expected date: 05/07/2024  -     Comprehensive Metabolic Panel; Future; Expected date: 05/07/2024  -     Lipid Panel; Future; Expected date: 05/07/2024  -     PSA, Screening; Future; Expected date: 05/07/2024  -     Testosterone; Future; Expected date: 05/07/2024      Orders Placed This Encounter   Procedures    CBC Auto Differential     Standing Status:   Future     Standing Expiration Date:   6/7/2024    Comprehensive Metabolic Panel     Standing Status:   Future     Standing Expiration Date:   6/7/2024    Lipid Panel     Standing Status:   Future     Standing Expiration Date:   5/7/2024    PSA, Screening     Standing Status:   Future     Standing Expiration Date:   8/7/2024    Testosterone     Standing Status:   Future     Standing Expiration Date:   4/7/2025    Ambulatory referral/consult to Dermatology     Standing Status:   Future     Standing Expiration Date:   3/7/2025      Referral Priority:   Routine     Referral Type:   Consultation     Referral Reason:   Specialty Services Required     Referred to Provider:   Richard Elam MD     Requested Specialty:   Dermatology     Number of Visits Requested:   1     Education and counseling done face to face regarding medical conditions and plan. Contact office if new symptoms develop. Should any symptoms ever significantly worsen seek emergency medical attention/go to ER. Follow up at least yearly for wellness or sooner PRN. Nurse to call patient with any results. The patient is receptive, expresses understanding and is agreeable to plan. All questions have been answered.  No follow-ups on file.

## 2024-04-17 RX ORDER — TESTOSTERONE CYPIONATE 200 MG/ML
INJECTION, SOLUTION INTRAMUSCULAR
Qty: 10 ML | Refills: 3 | Status: SHIPPED | OUTPATIENT
Start: 2024-04-17

## 2024-04-25 ENCOUNTER — PATIENT MESSAGE (OUTPATIENT)
Dept: INTERNAL MEDICINE | Facility: CLINIC | Age: 39
End: 2024-04-25
Payer: COMMERCIAL

## 2024-10-16 DIAGNOSIS — R73.01 ELEVATED FASTING GLUCOSE: ICD-10-CM

## 2024-10-16 DIAGNOSIS — Z00.00 ANNUAL PHYSICAL EXAM: Primary | ICD-10-CM

## 2024-10-16 DIAGNOSIS — R53.83 FATIGUE, UNSPECIFIED TYPE: ICD-10-CM

## 2024-10-18 ENCOUNTER — LAB VISIT (OUTPATIENT)
Dept: LAB | Facility: HOSPITAL | Age: 39
End: 2024-10-18
Attending: INTERNAL MEDICINE
Payer: COMMERCIAL

## 2024-10-18 ENCOUNTER — TELEPHONE (OUTPATIENT)
Dept: INTERNAL MEDICINE | Facility: CLINIC | Age: 39
End: 2024-10-18
Payer: COMMERCIAL

## 2024-10-18 DIAGNOSIS — R73.01 ELEVATED FASTING GLUCOSE: ICD-10-CM

## 2024-10-18 DIAGNOSIS — R53.83 FATIGUE, UNSPECIFIED TYPE: ICD-10-CM

## 2024-10-18 DIAGNOSIS — Z00.00 ANNUAL PHYSICAL EXAM: ICD-10-CM

## 2024-10-18 DIAGNOSIS — R79.89 LOW TESTOSTERONE IN MALE: ICD-10-CM

## 2024-10-18 LAB
ALBUMIN SERPL-MCNC: 4.1 G/DL (ref 3.5–5)
ALBUMIN/GLOB SERPL: 1.2 RATIO (ref 1.1–2)
ALP SERPL-CCNC: 83 UNIT/L (ref 40–150)
ALT SERPL-CCNC: 59 UNIT/L (ref 0–55)
ANION GAP SERPL CALC-SCNC: 7 MEQ/L
AST SERPL-CCNC: 35 UNIT/L (ref 5–34)
BASOPHILS # BLD AUTO: 0.05 X10(3)/MCL
BASOPHILS NFR BLD AUTO: 0.7 %
BILIRUB SERPL-MCNC: 0.6 MG/DL
BUN SERPL-MCNC: 12.7 MG/DL (ref 8.9–20.6)
CALCIUM SERPL-MCNC: 9.7 MG/DL (ref 8.4–10.2)
CHLORIDE SERPL-SCNC: 108 MMOL/L (ref 98–107)
CHOLEST SERPL-MCNC: 162 MG/DL
CHOLEST/HDLC SERPL: 4 {RATIO} (ref 0–5)
CO2 SERPL-SCNC: 26 MMOL/L (ref 22–29)
CREAT SERPL-MCNC: 1.14 MG/DL (ref 0.72–1.25)
CREAT/UREA NIT SERPL: 11
EOSINOPHIL # BLD AUTO: 0.21 X10(3)/MCL (ref 0–0.9)
EOSINOPHIL NFR BLD AUTO: 3 %
ERYTHROCYTE [DISTWIDTH] IN BLOOD BY AUTOMATED COUNT: 11.8 % (ref 11.5–17)
EST. AVERAGE GLUCOSE BLD GHB EST-MCNC: 91.1 MG/DL
GFR SERPLBLD CREATININE-BSD FMLA CKD-EPI: >60 ML/MIN/1.73/M2
GLOBULIN SER-MCNC: 3.5 GM/DL (ref 2.4–3.5)
GLUCOSE SERPL-MCNC: 107 MG/DL (ref 74–100)
HBA1C MFR BLD: 4.8 %
HCT VFR BLD AUTO: 46.5 % (ref 42–52)
HDLC SERPL-MCNC: 43 MG/DL (ref 35–60)
HGB BLD-MCNC: 16.4 G/DL (ref 14–18)
IMM GRANULOCYTES # BLD AUTO: 0.02 X10(3)/MCL (ref 0–0.04)
IMM GRANULOCYTES NFR BLD AUTO: 0.3 %
LDLC SERPL CALC-MCNC: 94 MG/DL (ref 50–140)
LYMPHOCYTES # BLD AUTO: 1.73 X10(3)/MCL (ref 0.6–4.6)
LYMPHOCYTES NFR BLD AUTO: 24.3 %
MCH RBC QN AUTO: 31.9 PG (ref 27–31)
MCHC RBC AUTO-ENTMCNC: 35.3 G/DL (ref 33–36)
MCV RBC AUTO: 90.5 FL (ref 80–94)
MONOCYTES # BLD AUTO: 0.61 X10(3)/MCL (ref 0.1–1.3)
MONOCYTES NFR BLD AUTO: 8.6 %
NEUTROPHILS # BLD AUTO: 4.49 X10(3)/MCL (ref 2.1–9.2)
NEUTROPHILS NFR BLD AUTO: 63.1 %
NRBC BLD AUTO-RTO: 0 %
PLATELET # BLD AUTO: 311 X10(3)/MCL (ref 130–400)
PMV BLD AUTO: 9.4 FL (ref 7.4–10.4)
POTASSIUM SERPL-SCNC: 4.4 MMOL/L (ref 3.5–5.1)
PROT SERPL-MCNC: 7.6 GM/DL (ref 6.4–8.3)
RBC # BLD AUTO: 5.14 X10(6)/MCL (ref 4.7–6.1)
SODIUM SERPL-SCNC: 141 MMOL/L (ref 136–145)
TESTOST SERPL-MCNC: 342.69 NG/DL (ref 240.24–870.68)
TRIGL SERPL-MCNC: 125 MG/DL (ref 34–140)
TSH SERPL-ACNC: 1.14 UIU/ML (ref 0.35–4.94)
VLDLC SERPL CALC-MCNC: 25 MG/DL
WBC # BLD AUTO: 7.11 X10(3)/MCL (ref 4.5–11.5)

## 2024-10-18 PROCEDURE — 85025 COMPLETE CBC W/AUTO DIFF WBC: CPT

## 2024-10-18 PROCEDURE — 83036 HEMOGLOBIN GLYCOSYLATED A1C: CPT

## 2024-10-18 PROCEDURE — 80061 LIPID PANEL: CPT

## 2024-10-18 PROCEDURE — 80053 COMPREHEN METABOLIC PANEL: CPT

## 2024-10-18 PROCEDURE — 36415 COLL VENOUS BLD VENIPUNCTURE: CPT

## 2024-10-18 PROCEDURE — 84403 ASSAY OF TOTAL TESTOSTERONE: CPT

## 2024-10-18 PROCEDURE — 84443 ASSAY THYROID STIM HORMONE: CPT

## 2024-10-18 NOTE — TELEPHONE ENCOUNTER
"----- Message from Nurse Chan sent at 10/18/2024  7:42 AM CDT -----  Regarding: Dr. Dumont 10/25/2024 [place] 1020  Are there any outstanding tasks in chart? No, but needs FASTING labs, TO BE DONE AT  "[facility]" or lab location of choice PRIOR to appt    Is there any documentation of tasks? no    Has the pt seen another physician, been to ER, UCC, or admitted to hospital since last visit?    Has the pt done blood work or imaging since last visit?    5. PLEASE HAVE PATIENT BRING MEDICATION LIST OR BOTTLES TO EVERY OFFICE VISIT  "

## 2024-10-25 ENCOUNTER — OFFICE VISIT (OUTPATIENT)
Dept: INTERNAL MEDICINE | Facility: CLINIC | Age: 39
End: 2024-10-25
Payer: COMMERCIAL

## 2024-10-25 VITALS
BODY MASS INDEX: 25.91 KG/M2 | OXYGEN SATURATION: 98 % | HEART RATE: 94 BPM | HEIGHT: 70 IN | DIASTOLIC BLOOD PRESSURE: 72 MMHG | WEIGHT: 181 LBS | SYSTOLIC BLOOD PRESSURE: 108 MMHG

## 2024-10-25 DIAGNOSIS — Z00.00 ANNUAL PHYSICAL EXAM: Primary | ICD-10-CM

## 2024-10-25 RX ORDER — TESTOSTERONE CYPIONATE 200 MG/ML
200 INJECTION, SOLUTION INTRAMUSCULAR
Qty: 10 ML | Refills: 3 | Status: SHIPPED | OUTPATIENT
Start: 2024-10-25

## 2024-10-25 NOTE — PROGRESS NOTES
Patient ID: Eliot Montiel is a 39 y.o. male.    Chief Complaint: Annual Exam (Wellness. Discuss labs (drawn 10/18/2024))      HPI:   Patient presents here today for above reason.     Health status:  good   Exercise:  regulkarly   Diet:  regular  Caffeine: in am   ETOH: social   Tobacco use: none   Colon Ca Screening: due at least every 10 years starting at 45  PSA: due yearly starting at 46 yo    The patient's Health Maintenance was reviewed and the following appears to be due at this time:   Health Maintenance Due   Topic Date Due    Hepatitis C Screening  Never done    HIV Screening  Never done    TETANUS VACCINE  Never done    Influenza Vaccine (1) Never done    COVID-19 Vaccine (1 - 2024-25 season) Never done        Past Medical History:  Past Medical History:   Diagnosis Date    Known health problems: none      Past Surgical History:   Procedure Laterality Date    APPENDECTOMY      CHOLECYSTECTOMY      VASECTOMY       Review of patient's allergies indicates:  No Known Allergies  Current Outpatient Medications on File Prior to Visit   Medication Sig Dispense Refill    [DISCONTINUED] testosterone cypionate (DEPOTESTOTERONE CYPIONATE) 200 mg/mL injection INJECT 1 ML INTO THE MUSCLE EVERY 2 WEEKS. 10 mL 3     No current facility-administered medications on file prior to visit.     Social History     Socioeconomic History    Marital status:    Tobacco Use    Smoking status: Former     Types: Cigarettes    Smokeless tobacco: Never   Substance and Sexual Activity    Alcohol use: Yes     Alcohol/week: 7.0 standard drinks of alcohol     Types: 7 Drinks containing 0.5 oz of alcohol per week    Drug use: Never    Sexual activity: Yes     Social Drivers of Health     Financial Resource Strain: Medium Risk (2/5/2024)    Overall Financial Resource Strain (CARDIA)     Difficulty of Paying Living Expenses: Somewhat hard   Food Insecurity: No Food Insecurity (2/5/2024)    Hunger Vital Sign     Worried About Running  Out of Food in the Last Year: Never true     Ran Out of Food in the Last Year: Never true   Transportation Needs: No Transportation Needs (2/5/2024)    PRAPARE - Transportation     Lack of Transportation (Medical): No     Lack of Transportation (Non-Medical): No   Physical Activity: Insufficiently Active (2/5/2024)    Exercise Vital Sign     Days of Exercise per Week: 5 days     Minutes of Exercise per Session: 10 min   Stress: Stress Concern Present (2/5/2024)    [employer] [place] [employer] - Occupational Stress Questionnaire     Feeling of Stress : Very much   Housing Stability: High Risk (2/5/2024)    Housing Stability Vital Sign     Unable to Pay for Housing in the Last Year: Yes     Number of Places Lived in the Last Year: 1     Unstable Housing in the Last Year: No     Family History   Problem Relation Name Age of Onset    No Known Problems Mother      No Known Problems Father      No Known Problems Sister      No Known Problems Brother         ROS:   Constitutional: No weight gain, No fever, No chills, No fatigue.   Eyes: No blurring, No visual disturbances.   Ear/Nose/Mouth/Throat: No decreased hearing, No ear pain, No nasal congestion, No sore throat.   Respiratory: No shortness of breath, No cough, No wheezing.   Cardiovascular: No chest pain, No palpitations, No peripheral edema.   Gastrointestinal: No nausea, No vomiting, No diarrhea, No constipation, No abdominal pain.   Genitourinary: No dysuria, No hematuria.   Hematology/Lymphatics: No bruising tendency, No bleeding tendency, No swollen lymph glands.   Endocrine: No excessive thirst, No polyuria, No excessive hunger.   Musculoskeletal: No joint pain, No muscle pain, No decreased range of motion.   Integumentary: No rash, No pruritus.   Neurologic: No abnormal balance, No confusion, No headache.   Psychiatric: No anxiety, No depression, Not suicidal, No hallucinations.      Vitals/PE:   /72 (BP Location: Left arm, Patient Position:  "Sitting)   Pulse 94   Ht 5' 10" (1.778 m)   Wt 82.1 kg (181 lb)   SpO2 98%   BMI 25.97 kg/m²   Physical Exam  General: Alert and oriented, No acute distress.   Eye: Normal conjunctiva without exudate.  HENMT: Normocephalic/AT, Normal hearing, Oral mucosa is moist and pink   Neck: No goiter visualized.   Respiratory: Lungs CTAB, Respirations are non-labored, Breath sounds are equal, Symmetrical chest wall expansion.  Cardiovascular: Normal rate, Regular rhythm, No murmur, No edema.   Gastrointestinal: Non-distended.   Genitourinary: Deferred.  Musculoskeletal: Normal ROM, Normal gait, No deformities or amputations.  Integumentary: Warm, Dry, Intact. No diaphoresis, or flushing.  Neurologic: No focal deficits, Cranial Nerves II-XII are grossly intact.   Psychiatric: Cooperative, Appropriate mood & affect, Normal judgment, Non-suicidal.    Assessment/Plan:   1. Annual physical exam  Comments:  all labs  discussed   all Ok    Other orders  -     testosterone cypionate (DEPOTESTOTERONE CYPIONATE) 200 mg/mL injection; Inject 1 mL (200 mg total) into the muscle every 14 (fourteen) days.  Dispense: 10 mL; Refill: 3         ..  Medications Ordered This Encounter   Medications    testosterone cypionate (DEPOTESTOTERONE CYPIONATE) 200 mg/mL injection     Sig: Inject 1 mL (200 mg total) into the muscle every 14 (fourteen) days.     Dispense:  10 mL     Refill:  3        ..No orders of the defined types were placed in this encounter.        I have changed Eliot Montiel's testosterone cypionate.    No orders of the defined types were placed in this encounter.      Education and counseling done face to face regarding medical conditions and plan. Contact office if new symptoms develop. Should any symptoms ever significantly worsen seek emergency medical attention/go to ER. Follow up at least yearly for wellness or sooner PRN. Nurse to call patient with any results. The patient is receptive, expresses understanding and is " agreeable to plan. All questions have been answered.    Follow up in about 6 months (around 4/25/2025).

## 2025-04-21 DIAGNOSIS — Z79.899 HIGH RISK MEDICATION USE: ICD-10-CM

## 2025-04-21 DIAGNOSIS — R79.89 LOW TESTOSTERONE IN MALE: ICD-10-CM

## 2025-04-21 DIAGNOSIS — D75.1 POLYCYTHEMIA: ICD-10-CM

## 2025-04-22 ENCOUNTER — TELEPHONE (OUTPATIENT)
Dept: INTERNAL MEDICINE | Facility: CLINIC | Age: 40
End: 2025-04-22
Payer: COMMERCIAL

## 2025-04-22 NOTE — TELEPHONE ENCOUNTER
"----- Message from Nurse Chan sent at 4/21/2025  8:15 AM CDT -----  Regarding: Dr. Dumont 04/29/2025 6 mth    1000  Are there any outstanding tasks in chart? No, but needs FASTING labs, TO BE DONE AT  "[facility]" or lab location of choice PRIOR to apptIs there any documentation of tasks? noHas the pt seen another physician, been to ER, UCC, or admitted to hospital since last visit?Has the pt done blood work or imaging since last visit?5. PLEASE HAVE PATIENT BRING MEDICATION LIST OR BOTTLES TO EVERY OFFICE VISIT  "

## 2025-04-24 DIAGNOSIS — R79.89 LOW TESTOSTERONE IN MALE: Primary | ICD-10-CM

## 2025-04-24 RX ORDER — TESTOSTERONE CYPIONATE 200 MG/ML
INJECTION, SOLUTION INTRAMUSCULAR
Qty: 2 ML | Refills: 3 | Status: SHIPPED | OUTPATIENT
Start: 2025-04-24

## 2025-04-25 ENCOUNTER — LAB VISIT (OUTPATIENT)
Dept: LAB | Facility: HOSPITAL | Age: 40
End: 2025-04-25
Attending: INTERNAL MEDICINE
Payer: COMMERCIAL

## 2025-04-25 DIAGNOSIS — R79.89 LOW TESTOSTERONE IN MALE: ICD-10-CM

## 2025-04-25 DIAGNOSIS — D75.1 POLYCYTHEMIA: ICD-10-CM

## 2025-04-25 DIAGNOSIS — Z79.899 HIGH RISK MEDICATION USE: ICD-10-CM

## 2025-04-25 LAB
ALBUMIN SERPL-MCNC: 4 G/DL (ref 3.5–5)
ALBUMIN/GLOB SERPL: 1.2 RATIO (ref 1.1–2)
ALP SERPL-CCNC: 89 UNIT/L (ref 40–150)
ALT SERPL-CCNC: 27 UNIT/L (ref 0–55)
ANION GAP SERPL CALC-SCNC: 7 MEQ/L
AST SERPL-CCNC: 19 UNIT/L (ref 11–45)
BASOPHILS # BLD AUTO: 0.05 X10(3)/MCL
BASOPHILS NFR BLD AUTO: 0.7 %
BILIRUB SERPL-MCNC: 0.7 MG/DL
BUN SERPL-MCNC: 14.8 MG/DL (ref 8.9–20.6)
CALCIUM SERPL-MCNC: 9.2 MG/DL (ref 8.4–10.2)
CHLORIDE SERPL-SCNC: 109 MMOL/L (ref 98–107)
CO2 SERPL-SCNC: 27 MMOL/L (ref 22–29)
CREAT SERPL-MCNC: 1.28 MG/DL (ref 0.72–1.25)
CREAT/UREA NIT SERPL: 12
EOSINOPHIL # BLD AUTO: 0.19 X10(3)/MCL (ref 0–0.9)
EOSINOPHIL NFR BLD AUTO: 2.8 %
ERYTHROCYTE [DISTWIDTH] IN BLOOD BY AUTOMATED COUNT: 11.9 % (ref 11.5–17)
GFR SERPLBLD CREATININE-BSD FMLA CKD-EPI: >60 ML/MIN/1.73/M2
GLOBULIN SER-MCNC: 3.3 GM/DL (ref 2.4–3.5)
GLUCOSE SERPL-MCNC: 92 MG/DL (ref 74–100)
HCT VFR BLD AUTO: 49 % (ref 42–52)
HGB BLD-MCNC: 17.1 G/DL (ref 14–18)
IMM GRANULOCYTES # BLD AUTO: 0.01 X10(3)/MCL (ref 0–0.04)
IMM GRANULOCYTES NFR BLD AUTO: 0.1 %
LYMPHOCYTES # BLD AUTO: 2.09 X10(3)/MCL (ref 0.6–4.6)
LYMPHOCYTES NFR BLD AUTO: 30.8 %
MCH RBC QN AUTO: 31.3 PG (ref 27–31)
MCHC RBC AUTO-ENTMCNC: 34.9 G/DL (ref 33–36)
MCV RBC AUTO: 89.6 FL (ref 80–94)
MONOCYTES # BLD AUTO: 0.55 X10(3)/MCL (ref 0.1–1.3)
MONOCYTES NFR BLD AUTO: 8.1 %
NEUTROPHILS # BLD AUTO: 3.89 X10(3)/MCL (ref 2.1–9.2)
NEUTROPHILS NFR BLD AUTO: 57.5 %
NRBC BLD AUTO-RTO: 0 %
PLATELET # BLD AUTO: 295 X10(3)/MCL (ref 130–400)
PMV BLD AUTO: 9.6 FL (ref 7.4–10.4)
POTASSIUM SERPL-SCNC: 4.5 MMOL/L (ref 3.5–5.1)
PROT SERPL-MCNC: 7.3 GM/DL (ref 6.4–8.3)
RBC # BLD AUTO: 5.47 X10(6)/MCL (ref 4.7–6.1)
SODIUM SERPL-SCNC: 143 MMOL/L (ref 136–145)
TESTOST SERPL-MCNC: 230.46 NG/DL (ref 240.24–870.68)
WBC # BLD AUTO: 6.78 X10(3)/MCL (ref 4.5–11.5)

## 2025-04-25 PROCEDURE — 84403 ASSAY OF TOTAL TESTOSTERONE: CPT

## 2025-04-25 PROCEDURE — 80053 COMPREHEN METABOLIC PANEL: CPT

## 2025-04-25 PROCEDURE — 85025 COMPLETE CBC W/AUTO DIFF WBC: CPT

## 2025-04-25 PROCEDURE — 36415 COLL VENOUS BLD VENIPUNCTURE: CPT

## 2025-04-29 ENCOUNTER — OFFICE VISIT (OUTPATIENT)
Dept: INTERNAL MEDICINE | Facility: CLINIC | Age: 40
End: 2025-04-29
Payer: COMMERCIAL

## 2025-04-29 VITALS
HEART RATE: 52 BPM | BODY MASS INDEX: 26.92 KG/M2 | SYSTOLIC BLOOD PRESSURE: 108 MMHG | OXYGEN SATURATION: 98 % | DIASTOLIC BLOOD PRESSURE: 72 MMHG | HEIGHT: 70 IN | WEIGHT: 188 LBS

## 2025-04-29 DIAGNOSIS — R79.89 LOW TESTOSTERONE IN MALE: ICD-10-CM

## 2025-04-29 DIAGNOSIS — B35.1 ONYCHOMYCOSIS OF GREAT TOE: Primary | ICD-10-CM

## 2025-04-29 PROCEDURE — 3078F DIAST BP <80 MM HG: CPT | Mod: CPTII,,, | Performed by: INTERNAL MEDICINE

## 2025-04-29 PROCEDURE — 1160F RVW MEDS BY RX/DR IN RCRD: CPT | Mod: CPTII,,, | Performed by: INTERNAL MEDICINE

## 2025-04-29 PROCEDURE — 99214 OFFICE O/P EST MOD 30 MIN: CPT | Mod: ,,, | Performed by: INTERNAL MEDICINE

## 2025-04-29 PROCEDURE — 3074F SYST BP LT 130 MM HG: CPT | Mod: CPTII,,, | Performed by: INTERNAL MEDICINE

## 2025-04-29 PROCEDURE — 1159F MED LIST DOCD IN RCRD: CPT | Mod: CPTII,,, | Performed by: INTERNAL MEDICINE

## 2025-04-29 PROCEDURE — 3008F BODY MASS INDEX DOCD: CPT | Mod: CPTII,,, | Performed by: INTERNAL MEDICINE

## 2025-04-29 RX ORDER — TESTOSTERONE CYPIONATE 200 MG/ML
200 INJECTION, SOLUTION INTRAMUSCULAR
Qty: 10 ML | Refills: 3 | Status: SHIPPED | OUTPATIENT
Start: 2025-04-29

## 2025-04-29 RX ORDER — CICLOPIROX 80 MG/ML
SOLUTION TOPICAL NIGHTLY
Qty: 6.6 ML | Refills: 4 | Status: SHIPPED | OUTPATIENT
Start: 2025-04-29

## 2025-04-29 NOTE — PROGRESS NOTES
"Patient ID: Eliot Montiel is a 39 y.o. male.  Chief Complaint: Follow-up (6 mth. Discuss labs (drawn 04/25/2025))    HPI:   History of Present Illness          39 Year old male history of low testosterone here on follow-up to discuss blood work.  Mild polycythemia patient advised and recommended to do therapeutic phlebotomy.  Liver function test kidney function tests all within normal limits, prescriptions refilled for his testosterone   Denies any nocturia dysuria urgency frequency    Current Medications[1]  ROS:   Constitutional: No weight gain, No fever, No chills, No fatigue.   Eyes: No blurring, No visual disturbances.   Ear/Nose/Mouth/Throat: No decreased hearing, No ear pain, No nasal congestion, No sore throat.   Respiratory: No shortness of breath, No cough, No wheezing.   Cardiovascular: No chest pain, No palpitations, No peripheral edema.   Gastrointestinal: No nausea, No vomiting, No diarrhea, No constipation, No abdominal pain.   Genitourinary: No dysuria, No hematuria.   Hematology/Lymphatics: No bruising tendency, No bleeding tendency, No swollen lymph glands.   Endocrine: No excessive thirst, No polyuria, No excessive hunger.   Musculoskeletal: No joint pain, No muscle pain, No decreased range of motion.   Integumentary: No rash, No pruritus.  Left big toe nail fungus   Neurologic: No abnormal balance, No confusion, No headache.   Psychiatric: No anxiety, No depression, Not suicidal, No hallucinations.    PE/Vitals:   /72 (BP Location: Left arm, Patient Position: Sitting)   Pulse (!) 52   Ht 5' 10" (1.778 m)   Wt 85.3 kg (188 lb)   SpO2 98%   BMI 26.98 kg/m²   General: Alert and oriented, No acute distress.   Eye: Normal conjunctiva without exudate.  HENMT: Normocephalic/AT, Normal hearing, Oral mucosa is moist and pink   Neck: No goiter visualized.   Respiratory: Lungs CTAB, Respirations are non-labored, Breath sounds are equal, Symmetrical chest wall expansion.  Cardiovascular: " Normal rate, Regular rhythm, No murmur, No edema.   Gastrointestinal: Non-distended.   Genitourinary: Deferred.  Musculoskeletal: Normal ROM, Normal gait, No deformities or amputations.  Integumentary: Warm, Dry, Intact. No diaphoresis, or flushing.  Big tor nail fungus   Neurologic: No focal deficits, Cranial Nerves II-XII are grossly intact.   Psychiatric: Cooperative, Appropriate mood & affect, Normal judgment, Non-suicidal.  Assessment/Plan   Assessment & Plan             1. Onychomycosis of great toe  Comments:  Prescription for Penlac to apply nightly for 7 days then removed with rubbing alcohol and restart process    2. Low testosterone in male  Comments:  Polycythemia patient advised to do therapeutic phlebotomy, prescriptions refilled  Orders:  -     testosterone cypionate (DEPOTESTOTERONE CYPIONATE) 200 mg/mL injection; Inject 1 mL (200 mg total) into the muscle every 14 (fourteen) days.  Dispense: 10 mL; Refill: 3    Other orders  -     ciclopirox (PENLAC) 8 % Soln; Apply topically nightly.  Dispense: 6.6 mL; Refill: 4      No orders of the defined types were placed in this encounter.    Education and counseling done face to face regarding medical conditions and plan. Contact office if new symptoms develop. Should any symptoms ever significantly worsen seek emergency medical attention/go to ER. Follow up at least yearly for wellness or sooner PRN. Nurse to call patient with any results. The patient is receptive, expresses understanding and is agreeable to plan. All questions have been answered.  No follow-ups on file.  This note was generated with the assistance of ambient listening technology. Verbal consent was obtained by the patient and accompanying visitor(s) for the recording of patient appointment to facilitate this note. I attest to having reviewed and edited the generated note for accuracy, though some syntax or spelling errors may persist. Please contact the author of this note for any  clarification.            [1]   Current Outpatient Medications:     ciclopirox (PENLAC) 8 % Soln, Apply topically nightly., Disp: 6.6 mL, Rfl: 4    testosterone cypionate (DEPOTESTOTERONE CYPIONATE) 200 mg/mL injection, Inject 1 mL (200 mg total) into the muscle every 14 (fourteen) days., Disp: 10 mL, Rfl: 3

## 2025-08-18 DIAGNOSIS — R79.89 LOW TESTOSTERONE IN MALE: ICD-10-CM

## 2025-08-18 RX ORDER — TESTOSTERONE CYPIONATE 200 MG/ML
INJECTION, SOLUTION INTRAMUSCULAR
Qty: 2 ML | Refills: 0 | Status: SHIPPED | OUTPATIENT
Start: 2025-08-18